# Patient Record
Sex: FEMALE | Race: BLACK OR AFRICAN AMERICAN | NOT HISPANIC OR LATINO
[De-identification: names, ages, dates, MRNs, and addresses within clinical notes are randomized per-mention and may not be internally consistent; named-entity substitution may affect disease eponyms.]

---

## 2020-07-10 ENCOUNTER — APPOINTMENT (OUTPATIENT)
Dept: GYNECOLOGIC ONCOLOGY | Facility: CLINIC | Age: 70
End: 2020-07-10
Payer: MEDICARE

## 2020-07-10 VITALS
WEIGHT: 174 LBS | BODY MASS INDEX: 29.71 KG/M2 | HEART RATE: 93 BPM | DIASTOLIC BLOOD PRESSURE: 95 MMHG | HEIGHT: 64 IN | SYSTOLIC BLOOD PRESSURE: 155 MMHG

## 2020-07-10 DIAGNOSIS — Z86.39 PERSONAL HISTORY OF OTHER ENDOCRINE, NUTRITIONAL AND METABOLIC DISEASE: ICD-10-CM

## 2020-07-10 PROCEDURE — 99205 OFFICE O/P NEW HI 60 MIN: CPT

## 2020-07-10 RX ORDER — CALCIUM CITRATE/VITAMIN D3 315MG-6.25
TABLET ORAL
Refills: 0 | Status: ACTIVE | COMMUNITY

## 2020-07-12 ENCOUNTER — TRANSCRIPTION ENCOUNTER (OUTPATIENT)
Age: 70
End: 2020-07-12

## 2020-07-13 ENCOUNTER — OUTPATIENT (OUTPATIENT)
Dept: OUTPATIENT SERVICES | Facility: HOSPITAL | Age: 70
LOS: 1 days | End: 2020-07-13
Payer: MEDICARE

## 2020-07-13 ENCOUNTER — APPOINTMENT (OUTPATIENT)
Dept: CT IMAGING | Facility: CLINIC | Age: 70
End: 2020-07-13
Payer: MEDICARE

## 2020-07-13 ENCOUNTER — RESULT REVIEW (OUTPATIENT)
Age: 70
End: 2020-07-13

## 2020-07-13 DIAGNOSIS — D25.9 LEIOMYOMA OF UTERUS, UNSPECIFIED: ICD-10-CM

## 2020-07-13 DIAGNOSIS — N83.8 OTHER NONINFLAMMATORY DISORDERS OF OVARY, FALLOPIAN TUBE AND BROAD LIGAMENT: ICD-10-CM

## 2020-07-13 DIAGNOSIS — R14.0 ABDOMINAL DISTENSION (GASEOUS): ICD-10-CM

## 2020-07-13 LAB
CANCER AG125 SERPL-ACNC: 16 U/ML
CANCER AG19-9 SERPL-ACNC: 39 U/ML
CEA SERPL-MCNC: 2.2 NG/ML

## 2020-07-13 PROCEDURE — 82565 ASSAY OF CREATININE: CPT

## 2020-07-13 PROCEDURE — 74177 CT ABD & PELVIS W/CONTRAST: CPT

## 2020-07-13 PROCEDURE — 74177 CT ABD & PELVIS W/CONTRAST: CPT | Mod: 26

## 2020-08-04 ENCOUNTER — APPOINTMENT (OUTPATIENT)
Dept: GYNECOLOGIC ONCOLOGY | Facility: CLINIC | Age: 70
End: 2020-08-04
Payer: MEDICARE

## 2020-08-04 DIAGNOSIS — R14.0 ABDOMINAL DISTENSION (GASEOUS): ICD-10-CM

## 2020-08-04 PROCEDURE — 99213 OFFICE O/P EST LOW 20 MIN: CPT | Mod: 95

## 2020-08-05 RX ORDER — BLOOD-GLUCOSE METER
W/DEVICE EACH MISCELLANEOUS
Qty: 1 | Refills: 0 | Status: ACTIVE | COMMUNITY
Start: 2020-06-18

## 2020-08-05 RX ORDER — LANCETS 33 GAUGE
EACH MISCELLANEOUS
Qty: 100 | Refills: 0 | Status: ACTIVE | COMMUNITY
Start: 2020-06-22

## 2020-08-08 ENCOUNTER — TRANSCRIPTION ENCOUNTER (OUTPATIENT)
Age: 70
End: 2020-08-08

## 2020-09-02 ENCOUNTER — TRANSCRIPTION ENCOUNTER (OUTPATIENT)
Age: 70
End: 2020-09-02

## 2020-09-03 ENCOUNTER — TRANSCRIPTION ENCOUNTER (OUTPATIENT)
Age: 70
End: 2020-09-03

## 2020-09-08 ENCOUNTER — OUTPATIENT (OUTPATIENT)
Dept: OUTPATIENT SERVICES | Facility: HOSPITAL | Age: 70
LOS: 1 days | End: 2020-09-08
Payer: MEDICARE

## 2020-09-08 VITALS
HEIGHT: 63 IN | WEIGHT: 177.91 LBS | OXYGEN SATURATION: 98 % | SYSTOLIC BLOOD PRESSURE: 124 MMHG | DIASTOLIC BLOOD PRESSURE: 80 MMHG | TEMPERATURE: 98 F | RESPIRATION RATE: 16 BRPM | HEART RATE: 98 BPM

## 2020-09-08 DIAGNOSIS — N83.8 OTHER NONINFLAMMATORY DISORDERS OF OVARY, FALLOPIAN TUBE AND BROAD LIGAMENT: ICD-10-CM

## 2020-09-08 DIAGNOSIS — D25.9 LEIOMYOMA OF UTERUS, UNSPECIFIED: ICD-10-CM

## 2020-09-08 DIAGNOSIS — Z98.890 OTHER SPECIFIED POSTPROCEDURAL STATES: Chronic | ICD-10-CM

## 2020-09-08 LAB
ANION GAP SERPL CALC-SCNC: 15 MMO/L — HIGH (ref 7–14)
BLD GP AB SCN SERPL QL: NEGATIVE — SIGNIFICANT CHANGE UP
BUN SERPL-MCNC: 10 MG/DL — SIGNIFICANT CHANGE UP (ref 7–23)
CALCIUM SERPL-MCNC: 10.1 MG/DL — SIGNIFICANT CHANGE UP (ref 8.4–10.5)
CHLORIDE SERPL-SCNC: 103 MMOL/L — SIGNIFICANT CHANGE UP (ref 98–107)
CO2 SERPL-SCNC: 25 MMOL/L — SIGNIFICANT CHANGE UP (ref 22–31)
CREAT SERPL-MCNC: 0.79 MG/DL — SIGNIFICANT CHANGE UP (ref 0.5–1.3)
GLUCOSE SERPL-MCNC: 162 MG/DL — HIGH (ref 70–99)
HBA1C BLD-MCNC: 7.2 % — HIGH (ref 4–5.6)
HCT VFR BLD CALC: 44.8 % — SIGNIFICANT CHANGE UP (ref 34.5–45)
HGB BLD-MCNC: 14.6 G/DL — SIGNIFICANT CHANGE UP (ref 11.5–15.5)
MCHC RBC-ENTMCNC: 31.9 PG — SIGNIFICANT CHANGE UP (ref 27–34)
MCHC RBC-ENTMCNC: 32.6 % — SIGNIFICANT CHANGE UP (ref 32–36)
MCV RBC AUTO: 97.8 FL — SIGNIFICANT CHANGE UP (ref 80–100)
NRBC # FLD: 0 K/UL — SIGNIFICANT CHANGE UP (ref 0–0)
PLATELET # BLD AUTO: 216 K/UL — SIGNIFICANT CHANGE UP (ref 150–400)
PMV BLD: 11.4 FL — SIGNIFICANT CHANGE UP (ref 7–13)
POTASSIUM SERPL-MCNC: 4.5 MMOL/L — SIGNIFICANT CHANGE UP (ref 3.5–5.3)
POTASSIUM SERPL-SCNC: 4.5 MMOL/L — SIGNIFICANT CHANGE UP (ref 3.5–5.3)
RBC # BLD: 4.58 M/UL — SIGNIFICANT CHANGE UP (ref 3.8–5.2)
RBC # FLD: 11.8 % — SIGNIFICANT CHANGE UP (ref 10.3–14.5)
RH IG SCN BLD-IMP: POSITIVE — SIGNIFICANT CHANGE UP
SODIUM SERPL-SCNC: 143 MMOL/L — SIGNIFICANT CHANGE UP (ref 135–145)
WBC # BLD: 7.68 K/UL — SIGNIFICANT CHANGE UP (ref 3.8–10.5)
WBC # FLD AUTO: 7.68 K/UL — SIGNIFICANT CHANGE UP (ref 3.8–10.5)

## 2020-09-08 PROCEDURE — 93010 ELECTROCARDIOGRAM REPORT: CPT | Mod: 76

## 2020-09-08 NOTE — H&P PST ADULT - NEGATIVE CARDIOVASCULAR SYMPTOMS
no claudication/no peripheral edema/no chest pain/no palpitations/no paroxysmal nocturnal dyspnea/no dyspnea on exertion

## 2020-09-08 NOTE — H&P PST ADULT - NEGATIVE GENERAL GENITOURINARY SYMPTOMS
no renal colic/no dysuria/no flank pain R/no flank pain L/no bladder infections/no urine discoloration/normal urinary frequency/no hematuria/no gas in urine/no urinary hesitancy/no nocturia

## 2020-09-08 NOTE — H&P PST ADULT - RS GEN PE MLT RESP DETAILS PC
respirations non-labored/no wheezes/no chest wall tenderness/clear to auscultation bilaterally/good air movement/airway patent/breath sounds equal/no intercostal retractions no rales/clear to auscultation bilaterally/airway patent/no subcutaneous emphysema/good air movement/no wheezes/no intercostal retractions/respirations non-labored/no chest wall tenderness/breath sounds equal/no rhonchi

## 2020-09-08 NOTE — H&P PST ADULT - NSICDXPROBLEM_GEN_ALL_CORE_FT
PROBLEM DIAGNOSES  Problem: Leiomyoma of uterus, unspecified  Assessment and Plan: Scheduled for robotic assisted total laparoscopic hysterectomy, bilateral salpingo oophorectomy, possible staging, possible open on 09/17/20. Pre op instructions, famotidine, chlorhexidine gluconate soap given and explained. Pt verbalized understanding.  Pending medical conmsultation as per surgeon's request  Pt has scheduled appt for Coid-19 swab on 09/14/20

## 2020-09-08 NOTE — H&P PST ADULT - NSANTHOSAYNRD_GEN_A_CORE
No. ALIREZA screening performed.  STOP BANG Legend: 0-2 = LOW Risk; 3-4 = INTERMEDIATE Risk; 5-8 = HIGH Risk

## 2020-09-08 NOTE — H&P PST ADULT - HISTORY OF PRESENT ILLNESS
69 y/o Black female with PMHx of DM type 2, Dyslipidemia, Depresdsion with anxiety presents to PST for pre op evaluation   S/P US of abd. pelvis "didn't show my ovary". S/P MRI 12/2019 showed left ovarian mass,. S/P Ct scan on 07/2020 69 y/o Black female with PMHx of DM type 2, Dyslipidemia, Depression with anxiety presents to PST for pre op evaluation S/P US of abd. pelvis "didn't show my ovary". S/P MRI 12/2019 showed left ovarian mass,. S/P Ct scan on 07/2020. Pre op diagnosis: Leiomyoma of uterus, unspecified. Now scheduled for robotic assisted total laparoscopic hysterectomy, bilateral salpingo oophorectomy, possible staging, possible open on 09/17/20

## 2020-09-08 NOTE — H&P PST ADULT - NEGATIVE ENMT SYMPTOMS
no hearing difficulty/no nasal congestion/no post-nasal discharge no throat pain/no tinnitus/no vertigo/no recurrent cold sores/no abnormal taste sensation/no nasal congestion/no nose bleeds/no hearing difficulty/no ear pain/no gum bleeding/no nasal obstruction/no sinus symptoms/no nasal discharge/no post-nasal discharge/no dry mouth

## 2020-09-08 NOTE — H&P PST ADULT - NEGATIVE NEUROLOGICAL SYMPTOMS
no difficulty walking/no vertigo/no loss of sensation/no generalized seizures/no syncope/no tremors/no transient paralysis/no weakness/no paresthesias/no headache

## 2020-09-08 NOTE — H&P PST ADULT - NEGATIVE OPHTHALMOLOGIC SYMPTOMS
no pain L/no loss of vision R/no irritation R/no diplopia/no photophobia/no pain R/no loss of vision L/no blurred vision L/no blurred vision R/no lacrimation L/no lacrimation R/no discharge L

## 2020-09-08 NOTE — H&P PST ADULT - GASTROINTESTINAL DETAILS
nontender/no distention/soft/no masses palpable/bowel sounds normal no guarding/no rebound tenderness/no rigidity/bowel sounds normal/nontender/soft

## 2020-09-14 ENCOUNTER — TRANSCRIPTION ENCOUNTER (OUTPATIENT)
Age: 70
End: 2020-09-14

## 2020-09-14 ENCOUNTER — APPOINTMENT (OUTPATIENT)
Dept: DISASTER EMERGENCY | Facility: CLINIC | Age: 70
End: 2020-09-14

## 2020-09-15 LAB — SARS-COV-2 N GENE NPH QL NAA+PROBE: NOT DETECTED

## 2020-09-16 ENCOUNTER — TRANSCRIPTION ENCOUNTER (OUTPATIENT)
Age: 70
End: 2020-09-16

## 2020-09-16 ENCOUNTER — OUTPATIENT (OUTPATIENT)
Dept: OUTPATIENT SERVICES | Facility: HOSPITAL | Age: 70
LOS: 1 days | End: 2020-09-16

## 2020-09-16 ENCOUNTER — APPOINTMENT (OUTPATIENT)
Dept: CV DIAGNOSTICS | Facility: HOSPITAL | Age: 70
End: 2020-09-16
Payer: MEDICARE

## 2020-09-16 DIAGNOSIS — Z01.818 ENCOUNTER FOR OTHER PREPROCEDURAL EXAMINATION: ICD-10-CM

## 2020-09-16 DIAGNOSIS — Z98.890 OTHER SPECIFIED POSTPROCEDURAL STATES: Chronic | ICD-10-CM

## 2020-09-16 PROBLEM — E78.5 HYPERLIPIDEMIA, UNSPECIFIED: Chronic | Status: ACTIVE | Noted: 2020-09-08

## 2020-09-16 PROBLEM — F41.8 OTHER SPECIFIED ANXIETY DISORDERS: Chronic | Status: ACTIVE | Noted: 2020-09-08

## 2020-09-16 PROCEDURE — 93018 CV STRESS TEST I&R ONLY: CPT | Mod: GC

## 2020-09-16 PROCEDURE — 78452 HT MUSCLE IMAGE SPECT MULT: CPT | Mod: 26

## 2020-09-16 PROCEDURE — 93016 CV STRESS TEST SUPVJ ONLY: CPT | Mod: GC

## 2020-09-25 ENCOUNTER — TRANSCRIPTION ENCOUNTER (OUTPATIENT)
Age: 70
End: 2020-09-25

## 2020-10-03 PROBLEM — R14.0 BLOATING: Status: ACTIVE | Noted: 2020-07-10

## 2020-10-05 ENCOUNTER — TRANSCRIPTION ENCOUNTER (OUTPATIENT)
Age: 70
End: 2020-10-05

## 2020-10-05 ENCOUNTER — APPOINTMENT (OUTPATIENT)
Dept: DISASTER EMERGENCY | Facility: CLINIC | Age: 70
End: 2020-10-05

## 2020-10-07 RX ORDER — QUETIAPINE 300 MG/1
300 TABLET, FILM COATED ORAL DAILY
Refills: 0 | Status: ACTIVE | COMMUNITY

## 2020-10-07 RX ORDER — AZITHROMYCIN 250 MG/1
250 TABLET, FILM COATED ORAL
Qty: 6 | Refills: 0 | Status: DISCONTINUED | COMMUNITY
Start: 2020-03-05 | End: 2020-10-07

## 2020-10-07 RX ORDER — PREDNISONE 20 MG/1
20 TABLET ORAL
Qty: 8 | Refills: 0 | Status: DISCONTINUED | COMMUNITY
Start: 2020-03-05 | End: 2020-10-07

## 2020-10-07 RX ORDER — QUETIAPINE FUMARATE 300 MG/1
300 TABLET ORAL
Qty: 90 | Refills: 0 | Status: DISCONTINUED | COMMUNITY
Start: 2020-06-05 | End: 2020-10-07

## 2020-10-07 RX ORDER — ATORVASTATIN CALCIUM 80 MG/1
TABLET, FILM COATED ORAL
Refills: 0 | Status: DISCONTINUED | COMMUNITY
End: 2020-10-07

## 2020-10-08 ENCOUNTER — NON-APPOINTMENT (OUTPATIENT)
Age: 70
End: 2020-10-08

## 2020-10-08 ENCOUNTER — APPOINTMENT (OUTPATIENT)
Dept: GYNECOLOGIC ONCOLOGY | Facility: HOSPITAL | Age: 70
End: 2020-10-08

## 2020-10-08 ENCOUNTER — APPOINTMENT (OUTPATIENT)
Dept: CARDIOLOGY | Facility: CLINIC | Age: 70
End: 2020-10-08
Payer: MEDICARE

## 2020-10-08 VITALS
WEIGHT: 178 LBS | OXYGEN SATURATION: 96 % | BODY MASS INDEX: 30.39 KG/M2 | HEIGHT: 64 IN | SYSTOLIC BLOOD PRESSURE: 156 MMHG | HEART RATE: 114 BPM | TEMPERATURE: 97.8 F | DIASTOLIC BLOOD PRESSURE: 93 MMHG

## 2020-10-08 PROCEDURE — 99204 OFFICE O/P NEW MOD 45 MIN: CPT

## 2020-10-08 PROCEDURE — 93000 ELECTROCARDIOGRAM COMPLETE: CPT

## 2020-10-08 NOTE — PHYSICAL EXAM
[General Appearance - Well Developed] : well developed [Normal Appearance] : normal appearance [Well Groomed] : well groomed [General Appearance - Well Nourished] : well nourished [No Deformities] : no deformities [General Appearance - In No Acute Distress] : no acute distress [Normal Oral Mucosa] : normal oral mucosa [No Oral Pallor] : no oral pallor [No Oral Cyanosis] : no oral cyanosis [Normal Jugular Venous A Waves Present] : normal jugular venous A waves present [Normal Jugular Venous V Waves Present] : normal jugular venous V waves present [No Jugular Venous Umaña A Waves] : no jugular venous umaña A waves [Heart Rate And Rhythm] : heart rate and rhythm were normal [Heart Sounds] : normal S1 and S2 [Murmurs] : no murmurs present [Respiration, Rhythm And Depth] : normal respiratory rhythm and effort [Exaggerated Use Of Accessory Muscles For Inspiration] : no accessory muscle use [Auscultation Breath Sounds / Voice Sounds] : lungs were clear to auscultation bilaterally [Abdomen Soft] : soft [Abdomen Tenderness] : non-tender [Abdomen Mass (___ Cm)] : no abdominal mass palpated [Abnormal Walk] : normal gait [Gait - Sufficient For Exercise Testing] : the gait was sufficient for exercise testing [Nail Clubbing] : no clubbing of the fingernails [Cyanosis, Localized] : no localized cyanosis [Petechial Hemorrhages (___cm)] : no petechial hemorrhages [Skin Color & Pigmentation] : normal skin color and pigmentation [] : no rash [No Venous Stasis] : no venous stasis [Skin Lesions] : no skin lesions [No Skin Ulcers] : no skin ulcer [No Xanthoma] : no  xanthoma was observed [Oriented To Time, Place, And Person] : oriented to person, place, and time [Affect] : the affect was normal [Mood] : the mood was normal [No Anxiety] : not feeling anxious

## 2020-10-08 NOTE — DISCUSSION/SUMMARY
[FreeTextEntry1] : 70 year old woman with history of suspicous mass on CT/MRI going for BERNY-BSO who is here for evlaution after abnormal stress test\par Patient with small, mild defect in the inferior wall consistent with mild ischemia\par No chest pain, dyspnea ir palpitations.\par EKG without ischemia\par She is on baby ASA\par BP is elevated- will start low dose Toprol 25 mg daily to assist with hypertension and abnormal stress test.\par Patient medically optimized for GYN surgery\par FU after surgery\par \par

## 2020-10-08 NOTE — HISTORY OF PRESENT ILLNESS
[FreeTextEntry1] : 70 year old woman with history of DM (on Metformin), HLD (on atorvastatin) and anxiety (on depakote and seroquel) who is here to establish care. She was being followed by outside cardiologsit and was sent in for preop eval to do nuclear stress test. Nuclear stress test showed normal LV function however small, mild inferior and inferoseptal ischemia. \par TTE done as outpatient 10/3/2020: that showed kaycee LV function\par She is due to undergo BERNY-BSO with Constantine Ngo due to suspicious mass \par She has no chest pain, dyspnea.\par EKG reviewed and normal\par SHe is active and she cooks and cleans and shops for herself. She gardens regularly.\par No smoking.

## 2020-10-08 NOTE — REASON FOR VISIT
[Initial Evaluation] : an initial evaluation of [FreeTextEntry2] : abnormal stress test, preop evaluation

## 2020-10-14 ENCOUNTER — RESULT REVIEW (OUTPATIENT)
Age: 70
End: 2020-10-14

## 2020-10-14 ENCOUNTER — OUTPATIENT (OUTPATIENT)
Dept: OUTPATIENT SERVICES | Facility: HOSPITAL | Age: 70
LOS: 1 days | End: 2020-10-14
Payer: MEDICARE

## 2020-10-14 ENCOUNTER — APPOINTMENT (OUTPATIENT)
Dept: SURGICAL ONCOLOGY | Facility: CLINIC | Age: 70
End: 2020-10-14
Payer: MEDICARE

## 2020-10-14 VITALS
RESPIRATION RATE: 15 BRPM | DIASTOLIC BLOOD PRESSURE: 83 MMHG | BODY MASS INDEX: 29.71 KG/M2 | HEIGHT: 64 IN | HEART RATE: 86 BPM | OXYGEN SATURATION: 96 % | WEIGHT: 174 LBS | SYSTOLIC BLOOD PRESSURE: 145 MMHG

## 2020-10-14 DIAGNOSIS — R92.1 MAMMOGRAPHIC CALCIFICATION FOUND ON DIAGNOSTIC IMAGING OF BREAST: ICD-10-CM

## 2020-10-14 DIAGNOSIS — R94.39 ABNORMAL RESULT OF OTHER CARDIOVASCULAR FUNCTION STUDY: ICD-10-CM

## 2020-10-14 DIAGNOSIS — Z98.890 OTHER SPECIFIED POSTPROCEDURAL STATES: Chronic | ICD-10-CM

## 2020-10-14 DIAGNOSIS — C50.919 MALIGNANT NEOPLASM OF UNSPECIFIED SITE OF UNSPECIFIED FEMALE BREAST: ICD-10-CM

## 2020-10-14 PROCEDURE — 99204 OFFICE O/P NEW MOD 45 MIN: CPT

## 2020-10-14 PROCEDURE — 88321 CONSLTJ&REPRT SLD PREP ELSWR: CPT

## 2020-10-14 NOTE — END OF VISIT
Thank you for coming by. Please come by in 1 and 6 months so we can monitor your blood pressure.    [Time Spent: ___ minutes] : I have spent [unfilled] minutes of time on the encounter. [>50% of the face to face encounter time was spent on counseling and/or coordination of care for ___] : Greater than 50% of the face to face encounter time was spent on counseling and/or coordination of care for [unfilled]

## 2020-10-16 PROBLEM — R94.39 ABNORMAL STRESS TEST: Status: RESOLVED | Noted: 2020-10-07 | Resolved: 2020-10-16

## 2020-10-16 NOTE — PAST MEDICAL HISTORY
[Postmenopausal] : The patient is postmenopausal [Menarche Age ____] : age at menarche was [unfilled] [Menopause Age____] : age at menopause was [unfilled] [History of Hormone Replacement Treatment] : has no history of hormone replacement treatment [Total Preg ___] : G[unfilled] [Live Births ___] : P[unfilled]  [AB Spont ___] : miscarriages: [unfilled]  [FreeTextEntry6] : none [FreeTextEntry7] : 1 year [FreeTextEntry8] : 3 months

## 2020-10-16 NOTE — CONSULT LETTER
[Dear  ___] : Dear  [unfilled], [Consult Letter:] : I had the pleasure of evaluating your patient, [unfilled]. [Please see my note below.] : Please see my note below. [Consult Closing:] : Thank you very much for allowing me to participate in the care of this patient.  If you have any questions, please do not hesitate to contact me. [Sincerely,] : Sincerely, [DrMarkus  ___] : Dr. CHINCHILLA [FreeTextEntry3] : Bella Mckeon MD\par Breast Surgeon\par Division of Surgical Oncology\par Department of Surgery\par [address] [place] [address]\[place] [address] \par Tel: [phone]\par Fax: [phone]\par Email: [email or web address]

## 2020-10-16 NOTE — HISTORY OF PRESENT ILLNESS
[FreeTextEntry1] : The patient is a 70 year old female presenting for consultation regarding new diagnosis of Right breast intraductal papilloma.\par \par A R breast US was performed 7/22/2020 for a BIRADS 3 study previously. This showed a Right subareolar ductal dilatation with redemonstration of a 9 x 3 x 5 mm hypoechoic intraductal mass. BIRADS 4A.\par \par A 9/28/2020 R USG-CNB was performed which revealed sclerosing intraductal papilloma.\par \par Today, the patient reports no breast complaints. Denies pain, masses, skin changes, or nipple discharge.\par

## 2020-10-16 NOTE — ASSESSMENT
[FreeTextEntry1] : The patient is a 70 year old female with a new diagnosis of Right breast intraductal papilloma.\par \par As these are high risk markers, excision is recommended.  I discussed with the patient that papillary lesions are difficult for pathologist to read on needle biopsy and thus excision is always recommended, to allow histologic evaluation of the entire lesion.  There is approximately 5 to 8% incidence of upgrading of these lesions, most commonly to either atypical hyperplasia or DCIS.  If this is not upgraded, this is not a breast cancer risk factor and she should return to routine breast care.  \par \par Preoperative, intraoperative, and postoperative considerations were reviewed including anesthetic management and what she can expect when she is recovering from surgery. \par \par We also discussed the finding of the likely breast lipoma on her exam. The patient has noticed some "fullness" in the area for a long time, but has not noticed any changes in size. Report from her most recent R breast US does not mention an upper breast mass. We will follow-up prior imaging to follow size and appearance of the mass.\par \par All questions were answered to her satisfaction. Risks, benefits, and alternatives to proposed surgical procedure were reviewed and all questions were answered. Following our discussion, the patient will undergo a right breast excisional biopsy with ANTHONY  localization. She has already obtained medical clearance for a GYN procedure to be done next month. \par

## 2020-10-16 NOTE — PHYSICAL EXAM
[Normocephalic] : normocephalic [Atraumatic] : atraumatic [EOMI] : extra ocular movement intact [Supple] : supple [No Supraclavicular Adenopathy] : no supraclavicular adenopathy [No Cervical Adenopathy] : no cervical adenopathy [Examined in the supine and seated position] : examined in the supine and seated position [Symmetrical] : symmetrical [Bra Size: ___] : Bra Size: [unfilled] [No dominant masses] : no dominant masses left breast [No Nipple Retraction] : no left nipple retraction [No Nipple Discharge] : no left nipple discharge [Breast Nipple Inversion] : nipples not inverted [Breast Mass Left Breast ___cm] : no masses [Breast Nipple Retraction] : nipples not retracted [Breast Nipple Fissures] : nipples not fissured [Breast Nipple Flattening] : nipples not flattened [Breast Abnormal Secretion Bloody Fluid] : no bloody discharge [Breast Abnormal Secretion Opalescent Fluid] : no milky discharge [Breast Abnormal Secretion Serous Fluid] : no serous discharge [Breast Abnormal Lactation (Galactorrhea)] : no galactorrhea [No Axillary Lymphadenopathy] : no right axillary lymphadenopathy [Soft] : abdomen soft [No Swelling] : no swelling [No Edema] : no edema [Full ROM] : full range of motion [No Rashes] : no rashes [de-identified] : non-labored respirations  [No Ulceration] : no ulceration [de-identified] : Right upper breast immediately below clavicle is an approximate 4 cm well-circumscribed soft, fatty mobile mass (not described on most recent US 7/22/2020)

## 2020-10-20 ENCOUNTER — OUTPATIENT (OUTPATIENT)
Dept: OUTPATIENT SERVICES | Facility: HOSPITAL | Age: 70
LOS: 1 days | End: 2020-10-20

## 2020-10-20 VITALS
WEIGHT: 171.96 LBS | HEIGHT: 63.5 IN | TEMPERATURE: 98 F | OXYGEN SATURATION: 98 % | HEART RATE: 83 BPM | DIASTOLIC BLOOD PRESSURE: 74 MMHG | RESPIRATION RATE: 16 BRPM | SYSTOLIC BLOOD PRESSURE: 120 MMHG

## 2020-10-20 DIAGNOSIS — N63.0 UNSPECIFIED LUMP IN UNSPECIFIED BREAST: ICD-10-CM

## 2020-10-20 DIAGNOSIS — D36.9 BENIGN NEOPLASM, UNSPECIFIED SITE: ICD-10-CM

## 2020-10-20 DIAGNOSIS — E11.9 TYPE 2 DIABETES MELLITUS WITHOUT COMPLICATIONS: ICD-10-CM

## 2020-10-20 DIAGNOSIS — Z98.890 OTHER SPECIFIED POSTPROCEDURAL STATES: Chronic | ICD-10-CM

## 2020-10-20 DIAGNOSIS — Z01.818 ENCOUNTER FOR OTHER PREPROCEDURAL EXAMINATION: ICD-10-CM

## 2020-10-20 LAB
ANION GAP SERPL CALC-SCNC: 14 MMO/L — SIGNIFICANT CHANGE UP (ref 7–14)
BUN SERPL-MCNC: 15 MG/DL — SIGNIFICANT CHANGE UP (ref 7–23)
CALCIUM SERPL-MCNC: 9.5 MG/DL — SIGNIFICANT CHANGE UP (ref 8.4–10.5)
CHLORIDE SERPL-SCNC: 97 MMOL/L — LOW (ref 98–107)
CO2 SERPL-SCNC: 28 MMOL/L — SIGNIFICANT CHANGE UP (ref 22–31)
CREAT SERPL-MCNC: 0.7 MG/DL — SIGNIFICANT CHANGE UP (ref 0.5–1.3)
GLUCOSE SERPL-MCNC: 178 MG/DL — HIGH (ref 70–99)
HBA1C BLD-MCNC: 7.6 % — HIGH (ref 4–5.6)
HCT VFR BLD CALC: 41.5 % — SIGNIFICANT CHANGE UP (ref 34.5–45)
HGB BLD-MCNC: 13.9 G/DL — SIGNIFICANT CHANGE UP (ref 11.5–15.5)
MCHC RBC-ENTMCNC: 31.3 PG — SIGNIFICANT CHANGE UP (ref 27–34)
MCHC RBC-ENTMCNC: 33.5 % — SIGNIFICANT CHANGE UP (ref 32–36)
MCV RBC AUTO: 93.5 FL — SIGNIFICANT CHANGE UP (ref 80–100)
NRBC # FLD: 0 K/UL — SIGNIFICANT CHANGE UP (ref 0–0)
PLATELET # BLD AUTO: 217 K/UL — SIGNIFICANT CHANGE UP (ref 150–400)
PMV BLD: 11.5 FL — SIGNIFICANT CHANGE UP (ref 7–13)
POTASSIUM SERPL-MCNC: 4 MMOL/L — SIGNIFICANT CHANGE UP (ref 3.5–5.3)
POTASSIUM SERPL-SCNC: 4 MMOL/L — SIGNIFICANT CHANGE UP (ref 3.5–5.3)
RBC # BLD: 4.44 M/UL — SIGNIFICANT CHANGE UP (ref 3.8–5.2)
RBC # FLD: 11.7 % — SIGNIFICANT CHANGE UP (ref 10.3–14.5)
SODIUM SERPL-SCNC: 139 MMOL/L — SIGNIFICANT CHANGE UP (ref 135–145)
WBC # BLD: 7.44 K/UL — SIGNIFICANT CHANGE UP (ref 3.8–10.5)
WBC # FLD AUTO: 7.44 K/UL — SIGNIFICANT CHANGE UP (ref 3.8–10.5)

## 2020-10-20 NOTE — H&P PST ADULT - NSICDXPROBLEM_GEN_ALL_CORE_FT
PROBLEM DIAGNOSES  Problem: Benign breast lumps  Assessment and Plan: Pt scheduled for surgery and preop instructions including instructions for taking Famotidine and for Chlorhexidine use in showering on the day of surgery, given verbally and with use of  written materials, and patient confirming understanding of such instructions using  teach back   method.  OR booking notified of yue precautions and DM  Cardiac Clearance in chart     Problem: DM (diabetes mellitus)  Assessment and Plan: Pt to take last dose of Metformin 10/24/20 pm dose

## 2020-10-20 NOTE — H&P PST ADULT - NSICDXPASTMEDICALHX_GEN_ALL_CORE_FT
PAST MEDICAL HISTORY:  Depression with anxiety     Diabetes mellitus, type 2     Dyslipidemia     H/O benign neoplasm of breast     H/O ovarian cystectomy     Obesity     Uterine fibroid

## 2020-10-20 NOTE — H&P PST ADULT - HISTORY OF PRESENT ILLNESS
Pt is a     71 y/o Black female with PMHx of DM type 2, Dyslipidemia, Depression with anxiety presents to PST for pre op evaluation S/P US of abd. pelvis "didn't show my ovary". S/P MRI 12/2019 showed left ovarian mass,. S/P Ct scan on 07/2020. Pre op diagnosis: Leiomyoma of uterus, unspecified. Now scheduled for robotic assisted total laparoscopic hysterectomy, bilateral salpingo oophorectomy, possible staging, possible open on 09/17/20 Pt is a 70 yr old female scheduled for Right Breast Excisional Biopsy with Lala  Localization with dr Mckeon tentatively 10/26/20 - pt found to have breast lump on Mammo 8/20 and u/s done - biopsy benign. Pt denies pain or bruising at biopsy site. Pt also to have Hysterectomy 11/12/20 for Uterine fibroid and ovarian mass - surgery had been postponed for CC - Dr Workman clearance in [employer] and printed for PST   Pt denies COVID or recent travel   pt to have COVID preop kelsey t

## 2020-10-21 ENCOUNTER — APPOINTMENT (OUTPATIENT)
Dept: SURGICAL ONCOLOGY | Facility: CLINIC | Age: 70
End: 2020-10-21

## 2020-10-22 ENCOUNTER — APPOINTMENT (OUTPATIENT)
Dept: DISASTER EMERGENCY | Facility: CLINIC | Age: 70
End: 2020-10-22

## 2020-10-22 PROBLEM — E66.9 OBESITY, UNSPECIFIED: Chronic | Status: ACTIVE | Noted: 2020-10-20

## 2020-10-22 PROBLEM — Z98.890 OTHER SPECIFIED POSTPROCEDURAL STATES: Chronic | Status: ACTIVE | Noted: 2020-10-20

## 2020-10-22 PROBLEM — Z86.018 PERSONAL HISTORY OF OTHER BENIGN NEOPLASM: Chronic | Status: ACTIVE | Noted: 2020-10-20

## 2020-10-23 ENCOUNTER — APPOINTMENT (OUTPATIENT)
Dept: MAMMOGRAPHY | Facility: IMAGING CENTER | Age: 70
End: 2020-10-23
Payer: MEDICARE

## 2020-10-23 ENCOUNTER — TRANSCRIPTION ENCOUNTER (OUTPATIENT)
Age: 70
End: 2020-10-23

## 2020-10-23 ENCOUNTER — RESULT REVIEW (OUTPATIENT)
Age: 70
End: 2020-10-23

## 2020-10-23 ENCOUNTER — NON-APPOINTMENT (OUTPATIENT)
Age: 70
End: 2020-10-23

## 2020-10-23 ENCOUNTER — OUTPATIENT (OUTPATIENT)
Dept: OUTPATIENT SERVICES | Facility: HOSPITAL | Age: 70
LOS: 1 days | End: 2020-10-23
Payer: MEDICARE

## 2020-10-23 DIAGNOSIS — Z98.890 OTHER SPECIFIED POSTPROCEDURAL STATES: Chronic | ICD-10-CM

## 2020-10-23 DIAGNOSIS — Z00.8 ENCOUNTER FOR OTHER GENERAL EXAMINATION: ICD-10-CM

## 2020-10-23 PROBLEM — R92.1 BREAST CALCIFICATIONS ON MAMMOGRAM: Status: ACTIVE | Noted: 2020-10-23

## 2020-10-23 LAB — SARS-COV-2 N GENE NPH QL NAA+PROBE: NOT DETECTED

## 2020-10-23 PROCEDURE — 77066 DX MAMMO INCL CAD BI: CPT | Mod: 26,59

## 2020-10-23 PROCEDURE — 19281 PERQ DEVICE BREAST 1ST IMAG: CPT | Mod: RT

## 2020-10-23 PROCEDURE — 19281 PERQ DEVICE BREAST 1ST IMAG: CPT

## 2020-10-23 PROCEDURE — 76641 ULTRASOUND BREAST COMPLETE: CPT

## 2020-10-23 PROCEDURE — 76641 ULTRASOUND BREAST COMPLETE: CPT | Mod: 26,50

## 2020-10-23 PROCEDURE — G0279: CPT | Mod: 26

## 2020-10-23 PROCEDURE — 77066 DX MAMMO INCL CAD BI: CPT

## 2020-10-23 PROCEDURE — G0279: CPT

## 2020-10-23 PROCEDURE — C1739: CPT

## 2020-10-26 ENCOUNTER — APPOINTMENT (OUTPATIENT)
Dept: MAMMOGRAPHY | Facility: IMAGING CENTER | Age: 70
End: 2020-10-26

## 2020-10-30 ENCOUNTER — RESULT REVIEW (OUTPATIENT)
Age: 70
End: 2020-10-30

## 2020-10-30 ENCOUNTER — OUTPATIENT (OUTPATIENT)
Dept: OUTPATIENT SERVICES | Facility: HOSPITAL | Age: 70
LOS: 1 days | End: 2020-10-30
Payer: MEDICARE

## 2020-10-30 ENCOUNTER — APPOINTMENT (OUTPATIENT)
Dept: MAMMOGRAPHY | Facility: IMAGING CENTER | Age: 70
End: 2020-10-30
Payer: MEDICARE

## 2020-10-30 DIAGNOSIS — R92.1 MAMMOGRAPHIC CALCIFICATION FOUND ON DIAGNOSTIC IMAGING OF BREAST: ICD-10-CM

## 2020-10-30 DIAGNOSIS — Z98.890 OTHER SPECIFIED POSTPROCEDURAL STATES: Chronic | ICD-10-CM

## 2020-10-30 DIAGNOSIS — Z00.8 ENCOUNTER FOR OTHER GENERAL EXAMINATION: ICD-10-CM

## 2020-10-30 PROCEDURE — 77065 DX MAMMO INCL CAD UNI: CPT

## 2020-10-30 PROCEDURE — A4648: CPT

## 2020-10-30 PROCEDURE — 77065 DX MAMMO INCL CAD UNI: CPT | Mod: 26,RT

## 2020-10-30 PROCEDURE — 19081 BX BREAST 1ST LESION STRTCTC: CPT | Mod: RT

## 2020-10-30 PROCEDURE — 88305 TISSUE EXAM BY PATHOLOGIST: CPT

## 2020-10-30 PROCEDURE — 19081 BX BREAST 1ST LESION STRTCTC: CPT

## 2020-10-30 PROCEDURE — 88305 TISSUE EXAM BY PATHOLOGIST: CPT | Mod: 26

## 2020-11-03 LAB — SURGICAL PATHOLOGY STUDY: SIGNIFICANT CHANGE UP

## 2020-11-06 ENCOUNTER — OUTPATIENT (OUTPATIENT)
Dept: OUTPATIENT SERVICES | Facility: HOSPITAL | Age: 70
LOS: 1 days | End: 2020-11-06

## 2020-11-06 VITALS
SYSTOLIC BLOOD PRESSURE: 126 MMHG | OXYGEN SATURATION: 99 % | HEIGHT: 63.25 IN | RESPIRATION RATE: 16 BRPM | TEMPERATURE: 98 F | DIASTOLIC BLOOD PRESSURE: 80 MMHG | HEART RATE: 85 BPM | WEIGHT: 173.06 LBS

## 2020-11-06 DIAGNOSIS — R83.8 OTHER ABNORMAL FINDINGS IN CEREBROSPINAL FLUID: ICD-10-CM

## 2020-11-06 DIAGNOSIS — Z98.890 OTHER SPECIFIED POSTPROCEDURAL STATES: Chronic | ICD-10-CM

## 2020-11-06 DIAGNOSIS — D25.9 LEIOMYOMA OF UTERUS, UNSPECIFIED: ICD-10-CM

## 2020-11-06 DIAGNOSIS — E11.9 TYPE 2 DIABETES MELLITUS WITHOUT COMPLICATIONS: ICD-10-CM

## 2020-11-06 LAB
BLD GP AB SCN SERPL QL: NEGATIVE — SIGNIFICANT CHANGE UP
RH IG SCN BLD-IMP: POSITIVE — SIGNIFICANT CHANGE UP

## 2020-11-06 RX ORDER — SODIUM CHLORIDE 9 MG/ML
3 INJECTION INTRAMUSCULAR; INTRAVENOUS; SUBCUTANEOUS EVERY 8 HOURS
Refills: 0 | Status: DISCONTINUED | OUTPATIENT
Start: 2020-11-12 | End: 2020-11-26

## 2020-11-06 RX ORDER — SODIUM CHLORIDE 9 MG/ML
1000 INJECTION, SOLUTION INTRAVENOUS
Refills: 0 | Status: DISCONTINUED | OUTPATIENT
Start: 2020-11-12 | End: 2020-11-26

## 2020-11-06 NOTE — H&P PST ADULT - NSICDXPROBLEM_GEN_ALL_CORE_FT
PROBLEM DIAGNOSES  Problem: Leiomyoma of uterus, unspecified  Assessment and Plan: Scheduled for Robotic assisted total laparoscopic hysterectomy, BSO, possible staging, possible open on 11/12/2020. Pre op instructions,, famotidine, chlorhexidien gluconate soap given and explained. Pt verbalized understanding.   Pt confirmed scheduled appt for Covid-19 swab pre op  Pending medical consultation as per surgeon's request    Problem: Type 2 diabetes mellitus  Assessment and Plan: Monitor BS on day of surgery  Pt instructed to hold metfor,min 24 hours prior to surgery. Pt verbalized understanding.

## 2020-11-06 NOTE — H&P PST ADULT - NEGATIVE NEUROLOGICAL SYMPTOMS
no weakness/no generalized seizures/no loss of sensation/no difficulty walking/no transient paralysis/no vertigo/no tremors

## 2020-11-06 NOTE — H&P PST ADULT - RS GEN PE MLT RESP DETAILS PC
no rales/breath sounds equal/respirations non-labored/no subcutaneous emphysema/no rhonchi/no chest wall tenderness/no intercostal retractions/airway patent/no wheezes/good air movement/clear to auscultation bilaterally

## 2020-11-06 NOTE — H&P PST ADULT - NEGATIVE OPHTHALMOLOGIC SYMPTOMS
no diplopia/no photophobia/no blurred vision L/no pain R/no loss of vision L/no discharge L/no pain L/no blurred vision R/no discharge R/no loss of vision R/no lacrimation L/no lacrimation R/no irritation L/no irritation R

## 2020-11-06 NOTE — H&P PST ADULT - NEGATIVE CARDIOVASCULAR SYMPTOMS
no orthopnea/no claudication/no dyspnea on exertion/no palpitations/no chest pain/no peripheral edema

## 2020-11-06 NOTE — H&P PST ADULT - HISTORY OF PRESENT ILLNESS
70 yr old female with H/O DM type 2,   yearly GYN exam S/P US / MRI (showed left ovarian mass and fibroids.         Pt denies COVID or recent travel    70 yr old female with H/O DM type 2, Dyslipidemia, Depression with Anxiety [id number] to PST for preop evaluation stated that on yearly GYN exam - US / MRI (showed left ovarian mass and fibroids. Scheduled for robotic assisted total laparoscopic hysterectomy, BSO, possible staging possible open on 11/12/2020     Pt denies COVID or recent travel

## 2020-11-06 NOTE — H&P PST ADULT - NEGATIVE GENERAL GENITOURINARY SYMPTOMS
no renal colic/no flank pain R/no bladder infections/normal urinary frequency/no hematuria/no dysuria/no flank pain L/no urine discoloration/no gas in urine/no nocturia

## 2020-11-06 NOTE — H&P PST ADULT - NEGATIVE GASTROINTESTINAL SYMPTOMS
no change in bowel habits/no abdominal pain/no nausea/no diarrhea/no vomiting/no hiccoughs no hiccoughs/no vomiting/no change in bowel habits/no abdominal pain/no diarrhea/no nausea/no melena

## 2020-11-06 NOTE — H&P PST ADULT - BREAST SYMPTOMS
feels a little shock in the right breast since Lala  insertion 10/20/2020 breast tenderness R/breast lump R/feels a little shock in the right breast since Lala  insertion 10/20/2020

## 2020-11-09 ENCOUNTER — NON-APPOINTMENT (OUTPATIENT)
Age: 70
End: 2020-11-09

## 2020-11-09 ENCOUNTER — APPOINTMENT (OUTPATIENT)
Dept: DISASTER EMERGENCY | Facility: CLINIC | Age: 70
End: 2020-11-09

## 2020-11-09 LAB — SARS-COV-2 N GENE NPH QL NAA+PROBE: NOT DETECTED

## 2020-11-11 ENCOUNTER — TRANSCRIPTION ENCOUNTER (OUTPATIENT)
Age: 70
End: 2020-11-11

## 2020-11-11 NOTE — ASU PATIENT PROFILE, ADULT - PMH
Depression with anxiety    Diabetes mellitus, type 2    Dyslipidemia    H/O benign neoplasm of breast    H/O ovarian cystectomy    Obesity    Uterine fibroid

## 2020-11-12 ENCOUNTER — NON-APPOINTMENT (OUTPATIENT)
Age: 70
End: 2020-11-12

## 2020-11-12 ENCOUNTER — APPOINTMENT (OUTPATIENT)
Dept: GYNECOLOGIC ONCOLOGY | Facility: HOSPITAL | Age: 70
End: 2020-11-12

## 2020-11-12 ENCOUNTER — RESULT REVIEW (OUTPATIENT)
Age: 70
End: 2020-11-12

## 2020-11-12 ENCOUNTER — OUTPATIENT (OUTPATIENT)
Dept: OUTPATIENT SERVICES | Facility: HOSPITAL | Age: 70
LOS: 1 days | Discharge: HOME | End: 2020-11-12
Payer: MEDICARE

## 2020-11-12 VITALS
RESPIRATION RATE: 16 BRPM | OXYGEN SATURATION: 98 % | DIASTOLIC BLOOD PRESSURE: 73 MMHG | HEART RATE: 85 BPM | WEIGHT: 173.06 LBS | SYSTOLIC BLOOD PRESSURE: 139 MMHG | TEMPERATURE: 99 F | HEIGHT: 63.25 IN

## 2020-11-12 VITALS
HEART RATE: 92 BPM | RESPIRATION RATE: 16 BRPM | OXYGEN SATURATION: 98 % | SYSTOLIC BLOOD PRESSURE: 134 MMHG | DIASTOLIC BLOOD PRESSURE: 73 MMHG

## 2020-11-12 DIAGNOSIS — R83.8 OTHER ABNORMAL FINDINGS IN CEREBROSPINAL FLUID: ICD-10-CM

## 2020-11-12 DIAGNOSIS — Z98.890 OTHER SPECIFIED POSTPROCEDURAL STATES: Chronic | ICD-10-CM

## 2020-11-12 LAB
BASOPHILS # BLD AUTO: 0.03 K/UL — SIGNIFICANT CHANGE UP (ref 0–0.2)
BASOPHILS NFR BLD AUTO: 0.2 % — SIGNIFICANT CHANGE UP (ref 0–2)
EOSINOPHIL # BLD AUTO: 0.03 K/UL — SIGNIFICANT CHANGE UP (ref 0–0.5)
EOSINOPHIL NFR BLD AUTO: 0.2 % — SIGNIFICANT CHANGE UP (ref 0–6)
HCT VFR BLD CALC: 40.4 % — SIGNIFICANT CHANGE UP (ref 34.5–45)
HGB BLD-MCNC: 13.9 G/DL — SIGNIFICANT CHANGE UP (ref 11.5–15.5)
IMM GRANULOCYTES NFR BLD AUTO: 0.5 % — SIGNIFICANT CHANGE UP (ref 0–1.5)
LYMPHOCYTES # BLD AUTO: 1.1 K/UL — SIGNIFICANT CHANGE UP (ref 1–3.3)
LYMPHOCYTES # BLD AUTO: 7.1 % — LOW (ref 13–44)
MCHC RBC-ENTMCNC: 32.4 PG — SIGNIFICANT CHANGE UP (ref 27–34)
MCHC RBC-ENTMCNC: 34.4 % — SIGNIFICANT CHANGE UP (ref 32–36)
MCV RBC AUTO: 94.2 FL — SIGNIFICANT CHANGE UP (ref 80–100)
MONOCYTES # BLD AUTO: 0.49 K/UL — SIGNIFICANT CHANGE UP (ref 0–0.9)
MONOCYTES NFR BLD AUTO: 3.2 % — SIGNIFICANT CHANGE UP (ref 2–14)
NEUTROPHILS # BLD AUTO: 13.82 K/UL — HIGH (ref 1.8–7.4)
NEUTROPHILS NFR BLD AUTO: 88.8 % — HIGH (ref 43–77)
NRBC # FLD: 0 K/UL — SIGNIFICANT CHANGE UP (ref 0–0)
PLATELET # BLD AUTO: 215 K/UL — SIGNIFICANT CHANGE UP (ref 150–400)
PMV BLD: 10.6 FL — SIGNIFICANT CHANGE UP (ref 7–13)
RBC # BLD: 4.29 M/UL — SIGNIFICANT CHANGE UP (ref 3.8–5.2)
RBC # FLD: 11.8 % — SIGNIFICANT CHANGE UP (ref 10.3–14.5)
WBC # BLD: 15.54 K/UL — HIGH (ref 3.8–10.5)
WBC # FLD AUTO: 15.54 K/UL — HIGH (ref 3.8–10.5)

## 2020-11-12 PROCEDURE — S2900 ROBOTIC SURGICAL SYSTEM: CPT | Mod: NC

## 2020-11-12 PROCEDURE — 88112 CYTOPATH CELL ENHANCE TECH: CPT | Mod: 26

## 2020-11-12 PROCEDURE — 58573 TLH W/T/O UTERUS OVER 250 G: CPT | Mod: GC

## 2020-11-12 PROCEDURE — 88307 TISSUE EXAM BY PATHOLOGIST: CPT | Mod: 26

## 2020-11-12 PROCEDURE — 88305 TISSUE EXAM BY PATHOLOGIST: CPT | Mod: 26

## 2020-11-12 RX ORDER — SODIUM CHLORIDE 9 MG/ML
1000 INJECTION, SOLUTION INTRAVENOUS
Refills: 0 | Status: DISCONTINUED | OUTPATIENT
Start: 2020-11-12 | End: 2020-11-26

## 2020-11-12 RX ORDER — ONDANSETRON 8 MG/1
4 TABLET, FILM COATED ORAL ONCE
Refills: 0 | Status: DISCONTINUED | OUTPATIENT
Start: 2020-11-12 | End: 2020-11-26

## 2020-11-12 RX ORDER — FENTANYL CITRATE 50 UG/ML
25 INJECTION INTRAVENOUS
Refills: 0 | Status: DISCONTINUED | OUTPATIENT
Start: 2020-11-12 | End: 2020-11-12

## 2020-11-12 RX ORDER — OXYCODONE HYDROCHLORIDE 5 MG/1
5 TABLET ORAL ONCE
Refills: 0 | Status: DISCONTINUED | OUTPATIENT
Start: 2020-11-12 | End: 2020-11-12

## 2020-11-12 RX ORDER — OXYCODONE AND ACETAMINOPHEN 5; 325 MG/1; MG/1
1 TABLET ORAL EVERY 6 HOURS
Refills: 0 | Status: DISCONTINUED | OUTPATIENT
Start: 2020-11-12 | End: 2020-11-12

## 2020-11-12 NOTE — ASU DISCHARGE PLAN (ADULT/PEDIATRIC) - ACTIVITY LEVEL
No tampons/Nothing per rectum/No intercourse/No heavy lifting/Nothing per vagina/No tub baths/No douching

## 2020-11-12 NOTE — BRIEF OPERATIVE NOTE - OPERATION/FINDINGS
Uterus sounded to 11.5cm.  Uterus containing multiple fibroids.  Left para-uterine mass approximately 6cm  grossly assessed as pedunculated fibroid.  Bilateral ovaries and tubes grossly normal.  Survey of upper abdomen revealed no abnormalities.

## 2020-11-12 NOTE — ASU DISCHARGE PLAN (ADULT/PEDIATRIC) - CARE PROVIDER_API CALL
Kiley Fitch)  Gynecologic Oncology; Obstetrics and Gynecology  [address] [place] [address]  Phone: [phone]  Fax: [phone]  Established Patient  Scheduled Appointment: 12/02/2020 09:00 AM

## 2020-11-12 NOTE — ASU DISCHARGE PLAN (ADULT/PEDIATRIC) - CALL YOUR DOCTOR IF YOU HAVE ANY OF THE FOLLOWING:
Bleeding that does not stop/Nausea and vomiting that does not stop/Unable to urinate/Fever greater than (need to indicate Fahrenheit or Celsius)/Pain not relieved by Medications Wound/Surgical Site with redness, or foul smelling discharge or pus/Fever greater than (need to indicate Fahrenheit or Celsius)/Nausea and vomiting that does not stop/Unable to urinate/Swelling that gets worse/Inability to tolerate liquids or foods/Pain not relieved by Medications/Bleeding that does not stop

## 2020-11-12 NOTE — ASU PREOP CHECKLIST - WEIGHT IN KG
HPI:   is a 73 y/o male w/ a recently diagnosed 6 cm left lower lobe infrahilar mass which extends into the left hilum.  He is s/p cardiac stent in June 2017.  Patient presented with hemoptysis and pleural effusion. A PET scan confirmed LLL mass with left hilar lymphadenopathy, pleural effusion and possible osseous metatastases.  He underwent a CT guided biopsy of the lung 7/28 followingf a/w pneumonia. He is currently a non-smoker, quit ~20 years ago, with a 30 PY history.  He was recently stented last month for CAD and is on asa/statin/plavix/and toprol. He presents in acute renal failure.     PAST MEDICAL & SURGICAL HISTORY:  Psoriasis    PSVT (paroxysmal supraventricular tachycardia)  Chronic systolic CHF (congestive heart failure), NYHA class 2  Former smoker  Hypokinesis: apical  Mitral regurgitation  Bronchitis  Hypertension  PSVT (paroxysmal supraventricular tachycardia)  Ischemic cardiomyopathy  AICD (automatic cardioverter/defibrillator) present: 2010 [employer]  VT (ventricular tachycardia): May 2017 no ICD shock  Vitamin D deficiency  Tricuspid regurgitation  RBBB  Prostate cancer: s/p surgery no RT/CT  Mitral regurgitation  Hypothyroidism  HLD (hyperlipidemia)  Arrhythmia  Angina pectoris  CAD (coronary artery disease)  History of bronchoscopy: 2017  History of prostate surgery: davinci surgery in 2008  Cardiac disorder: triple bypass may 1997 [facility]  History of electrophysiologic study: 2009 positive study (AICD placement  2010)  H/O cardiac catheterization: stenting of SVG TO PDA IN 2010 2017 one stent      MEDICATIONS  (STANDING):  amiodarone    Tablet 200 milliGRAM(s) Oral daily  atorvastatin 40 milliGRAM(s) Oral at bedtime  fenofibrate Tablet 48 milliGRAM(s) Oral daily  metoprolol succinate  milliGRAM(s) Oral daily  famotidine    Tablet 20 milliGRAM(s) Oral daily  levothyroxine 75 MICROGram(s) Oral daily  multivitamin/minerals 1 Tablet(s) Oral daily  senna 2 Tablet(s) Oral at bedtime  docusate sodium 100 milliGRAM(s) Oral three times a day  insulin lispro (HumaLOG) corrective regimen sliding scale   SubCutaneous three times a day before meals  insulin lispro (HumaLOG) corrective regimen sliding scale   SubCutaneous at bedtime  dextrose 5%. 1000 milliLiter(s) (50 mL/Hr) IV Continuous <Continuous>  dextrose 50% Injectable 12.5 Gram(s) IV Push once  dextrose 50% Injectable 25 Gram(s) IV Push once  dextrose 50% Injectable 25 Gram(s) IV Push once  levoFLOXacin  Tablet 500 milliGRAM(s) Oral every 24 hours  clopidogrel Tablet 75 milliGRAM(s) Oral daily  aspirin  chewable 81 milliGRAM(s) Oral daily  ceFAZolin   IVPB 1000 milliGRAM(s) IV Intermittent once  polyethylene glycol 3350 17 Gram(s) Oral two times a day  potassium chloride    Tablet ER 30 milliEquivalent(s) Oral two times a day    MEDICATIONS  (PRN):  dextrose Gel 1 Dose(s) Oral once PRN Blood Glucose LESS THAN 70 milliGRAM(s)/deciliter  glucagon  Injectable 1 milliGRAM(s) IntraMuscular once PRN Glucose LESS THAN 70 milligrams/deciliter  ALBUTerol/ipratropium for Nebulization 3 milliLiter(s) Nebulizer every 6 hours PRN Shortness of Breath and/or Wheezing  oxyCODONE    IR 5 milliGRAM(s) Oral every 4 hours PRN mild - moderate pain  oxyCODONE    IR 10 milliGRAM(s) Oral every 4 hours PRN Severe Pain (7 - 10)  zolpidem 5 milliGRAM(s) Oral at bedtime PRN Insomnia      Allergies    macrolide antibiotics (Urticaria; Rash; Hives)  penicillin (Urticaria; Rash)  Zithromax Z-Christian (Urticaria; Rash; Hives)    Intolerances        FAMILY HISTORY:  Family history of heart disease (Sibling)  Family history of diabetes mellitus (Sibling)  Family history of lung cancer (Sibling)  Family history of prostate cancer (Sibling)      Review of Systems    Constitutional, Eyes, ENT, Cardiovascular, Respiratory, Gastrointestinal, Genitourinary, Musculoskeletal, Integumentary, Neurological, Psychiatric, Endocrine, Heme/Lymph and Allergic/Immunologic review of systems are otherwise negative except as noted in HPI.     Vital Signs Last 24 Hrs  T(C): 36.3 (08 Aug 2017 08:08), Max: 37.2 (08 Aug 2017 02:06)  T(F): 97.4 (08 Aug 2017 08:08), Max: 99 (08 Aug 2017 02:06)  HR: 91 (08 Aug 2017 08:08) (78 - 93)  BP: 100/60 (08 Aug 2017 08:08) (91/60 - 106/60)  BP(mean): --  RR: 20 (08 Aug 2017 08:08) (18 - 20)  SpO2: 96% (08 Aug 2017 05:07) (96% - 96%)    Physical Exam  Constitutional: well developed, well nourished, in no acute distress and thin.   Eyes: PERRL, EOMI, no conjunctival infection, anicteric.   ENT: pharynx is unremarkable, moist mucus membrane, no oral lesions.   Neck: supple without JVD, no thyromegaly or masses appreciated.   Pulmonary: clear to auscultation bilaterally, no dullness, no wheezing.   < from: CT Chest No Cont (07.26.17 @ 10:47) >  IMPRESSION:   1.  Probable increased size of left lower lobe mass, although difficult   to delineate due to new near complete atelectasis of the left lower lobe.  2.  Pleural metastases with large left loculated pleural effusion.  3.  New nodule along the right mediastinal pleura.  4.  New pulmonary nodules in the right lung, probably metastases.    < end of copied text >  < from: CT Chest No Cont (07.26.17 @ 10:47) >   CT CHEST                          PROCEDURE DATE:  07/26/201    < end of copied text >  Cardiac: RRR, normal S1S2, no murmurs, rubs, gallops.   Vascular: no JVD, no calf tenderness, venous stasis changes, varices.   < from: CT Chest No Cont (07.26.17 @ 10:47) >  1.  Probable increased size of left lower lobe mass, although difficult   to delineate due to new near complete atelectasis of the left lower lobe.  2.  Pleural metastases with large left loculated pleural effusion.  3.  New nodule along the right mediastinal pleura.  4.  New pulmonary nodules in the right lung, probably metastases.    < end of copied text >  Abdomen: normoactive bowel sounds, soft and nontender, no hepatosplenomegaly or masses appreciated.   Lymphatic: no peripheral adenopathy appreciated.   Musculoskeletal: full range of motion and no deformities appreciated.   Skin: normal appearance, no rash, nodules, vesicles, ulcers, erythema.   Neurology: grossly intact.   Psychiatric: affect appropriate.       LABS:  CBC Full  -  ( 06 Aug 2017 12:04 )  WBC Count : 16.5 K/uL  Hemoglobin : 9.0 g/dL  Hematocrit : 28.4 %  Platelet Count - Automated : 325 K/uL  Mean Cell Volume : 85.5 fl  Mean Cell Hemoglobin : 27.1 pg  Mean Cell Hemoglobin Concentration : 31.7 g/dL  Auto Neutrophil # : x  Auto Lymphocyte # : x  Auto Monocyte # : x  Auto Eosinophil # : x  Auto Basophil # : x  Auto Neutrophil % : x  Auto Lymphocyte % : x  Auto Monocyte % : x  Auto Eosinophil % : x  Auto Basophil % : x    08-06    132<L>  |  98  |  19.0  ----------------------------<  139<H>  4.6   |  20.0<L>  |  1.06    Ca    9.6      06 Aug 2017 12:04    RADIOLOGY & ADDITIONAL STUDIES:      CT Chest No Cont (07.26.17 @ 10:47):  1.  Probable increased size of left lower lobe mass, although difficult to delineate due to new near complete atelectasis of the left lower lobe.  2.  Pleural metastases with large left loculated pleural effusion.  3.  New nodule along the right mediastinal pleura.  4.  New pulmonary nodules in the right lung, probably metastases.      7/29/17 Left lung, core biopsy: Adenocarcinoma of Lung, poorly differentiated    Immunohistochemistry study show Positive immunoreactivity to TTF-1, THROMBOMODULIN AND NAPSIN A (focally), Negative immunoreactivity to PSA, PSAP, UROPLAKIN, P63 AND CK 5/6.  These  findings support the above diagnosis 78.5
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
## 2020-11-12 NOTE — ASU DISCHARGE PLAN (ADULT/PEDIATRIC) - ASU DC SPECIAL INSTRUCTIONSFT
Return to your regular way of eating.  Resume normal activity as tolerated, but no heavy lifting or strenuous activity for 6 weeks.  No driving for next 2 weeks and/or while on narcotic pain medication.  Complete vaginal rest, no tampons, no douching, no tub bathing, no sexual activities for 6 weeks unless otherwise instructed by your doctor.  Call your doctor with any signs and symptoms of infection such as fever, chills, nausea or vomiting.  Call your doctor with redness or swelling at the incision site, fluid leakage or wound separation.  Call your doctor if you're unable to tolerate food or have difficulty urinating.  Call your doctor if you have pain that is not relieved by your prescribed medications.  Notify your doctor with any other concerns.  Follow up with Dr. Fitch as noted below.

## 2020-11-12 NOTE — CHART NOTE - NSCHARTNOTEFT_GEN_A_CORE
PA POST OP NOTE:       SUBJECTIVE:  Patient seen and examined at bedside. Patient is awake and resting comfortably. Reports mild pressure pain at this time. Denies c/o nausea, vomiting, sob, cp or palpitations. Jorge L sips of clears. Still DTV.    Vital Signs Last 24 Hrs  T(C): 36.4 (12 Nov 2020 11:45), Max: 37 (12 Nov 2020 06:03)  T(F): 97.5 (12 Nov 2020 11:45), Max: 98.6 (12 Nov 2020 06:03)  HR: 84 (12 Nov 2020 12:45) (81 - 91)  BP: 135/81 (12 Nov 2020 12:45) (133/80 - 149/81)  BP(mean): 97 (12 Nov 2020 12:45) (97 - 104)  RR: 14 (12 Nov 2020 12:45) (11 - 28)  SpO2: 94% (12 Nov 2020 12:45) (94% - 100%)    PHYSICAL EXAM:    LUNGS: Clear to auscultation bilaterally; No wheezing.  HEART: Regular, rate and rhythm; No murmurs.  ABDOMEN: Soft, decreased BS and appropriately tender on palpation.  INCISION:  Scope sites intact. Dressings clean and dry.   EXTREMITIES: No swelling or calf tenderness bilaterally. Venodynes in place.  ALDRICH:  Removed in OR.    MEDICATIONS  (STANDING):  lactated ringers. 1000 milliLiter(s) (30 mL/Hr) IV Continuous <Continuous>  lactated ringers. 1000 milliLiter(s) (125 mL/Hr) IV Continuous <Continuous>  naproxen 500 milliGRAM(s) Oral every 12 hours  sodium chloride 0.9% lock flush 3 milliLiter(s) IV Push every 8 hours    MEDICATIONS  (PRN):  fentaNYL    Injectable 25 MICROGram(s) IV Push every 5 minutes PRN Moderate Pain (4 - 6)  ondansetron Injectable 4 milliGRAM(s) IV Push once PRN Nausea and/or Vomiting  oxycodone    5 mG/acetaminophen 325 mG 1 Tablet(s) Oral every 6 hours PRN Severe Pain (7 - 10)  oxyCODONE    IR 5 milliGRAM(s) Oral once PRN Moderate Pain (4 - 6)      ASSESMENT/PLAN: 71y/o POD#0  from Robotic TLH,BSO in stable condition.    1.Clears to Regular diet as tolerate.  2. IVF: RL @125cc/hr.  3. Activity: Out of bed with assist.  4. Vital Signs Q routine.  5. Pulm: pulse Ox monitoring and encourage incentive spirometer use.  6.  Pain meds as ordered.  7. LABS: CBC at 2PM.  8. DTV by 6PM.  9. Evaluate for discharge home when tolerates diet, CBC stable, voids, ambulates and vss.

## 2020-11-13 LAB — GLUCOSE BLDC GLUCOMTR-MCNC: 176 MG/DL — HIGH (ref 70–99)

## 2020-11-17 LAB — NON-GYNECOLOGICAL CYTOLOGY STUDY: SIGNIFICANT CHANGE UP

## 2020-12-01 LAB — SURGICAL PATHOLOGY STUDY: SIGNIFICANT CHANGE UP

## 2020-12-07 PROBLEM — D25.9 FIBROID UTERUS: Status: ACTIVE | Noted: 2020-07-08

## 2020-12-07 PROBLEM — N85.00 ENDOMETRIAL HYPERPLASIA WITHOUT ATYPIA: Status: ACTIVE | Noted: 2020-12-07

## 2020-12-07 PROBLEM — Z48.89 POSTOPERATIVE VISIT: Status: ACTIVE | Noted: 2020-12-07

## 2020-12-07 PROBLEM — N83.8 OVARIAN MASS, LEFT: Status: ACTIVE | Noted: 2020-07-08

## 2020-12-09 ENCOUNTER — APPOINTMENT (OUTPATIENT)
Dept: GYNECOLOGIC ONCOLOGY | Facility: CLINIC | Age: 70
End: 2020-12-09
Payer: MEDICARE

## 2020-12-09 VITALS
TEMPERATURE: 97.9 F | BODY MASS INDEX: 29.37 KG/M2 | SYSTOLIC BLOOD PRESSURE: 126 MMHG | DIASTOLIC BLOOD PRESSURE: 80 MMHG | WEIGHT: 172 LBS | HEART RATE: 68 BPM | HEIGHT: 64 IN

## 2020-12-09 DIAGNOSIS — D25.9 LEIOMYOMA OF UTERUS, UNSPECIFIED: ICD-10-CM

## 2020-12-09 DIAGNOSIS — Z48.89 ENCOUNTER FOR OTHER SPECIFIED SURGICAL AFTERCARE: ICD-10-CM

## 2020-12-09 DIAGNOSIS — N85.00 ENDOMETRIAL HYPERPLASIA, UNSPECIFIED: ICD-10-CM

## 2020-12-09 DIAGNOSIS — N83.8 OTHER NONINFLAMMATORY DISORDERS OF OVARY, FALLOPIAN TUBE AND BROAD LIGAMENT: ICD-10-CM

## 2020-12-09 PROCEDURE — 99024 POSTOP FOLLOW-UP VISIT: CPT

## 2020-12-16 NOTE — BRIEF OPERATIVE NOTE - NSICDXBRIEFPROCEDURE_GEN_ALL_CORE_FT
normal... PROCEDURES:  Robot-assisted bilateral salpingo-oophorectomy 12-Nov-2020 10:46:26  Jose Padilla  Robot-assisted laparoscopic total hysterectomy for uterus greater than 250 grams 12-Nov-2020 10:46:06  Jose Padilla

## 2020-12-23 ENCOUNTER — APPOINTMENT (OUTPATIENT)
Dept: SURGICAL ONCOLOGY | Facility: CLINIC | Age: 70
End: 2020-12-23
Payer: MEDICARE

## 2020-12-23 VITALS
WEIGHT: 172 LBS | SYSTOLIC BLOOD PRESSURE: 134 MMHG | OXYGEN SATURATION: 97 % | HEART RATE: 82 BPM | HEIGHT: 64 IN | DIASTOLIC BLOOD PRESSURE: 83 MMHG | RESPIRATION RATE: 15 BRPM | BODY MASS INDEX: 29.37 KG/M2

## 2020-12-23 DIAGNOSIS — R22.2 LOCALIZED SWELLING, MASS AND LUMP, TRUNK: ICD-10-CM

## 2020-12-23 PROCEDURE — 99072 ADDL SUPL MATRL&STAF TM PHE: CPT

## 2020-12-23 PROCEDURE — 99215 OFFICE O/P EST HI 40 MIN: CPT

## 2020-12-30 PROBLEM — R22.2 CHEST WALL MASS: Status: ACTIVE | Noted: 2020-12-30

## 2020-12-30 NOTE — ASSESSMENT
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 \par Recommend right breast lumpectomy under gilson  localization and resection of right chest wall mass\par Surgical procedure discussed in detail\par All questions answered.

## 2020-12-30 NOTE — CONSULT LETTER
[Dear  ___] : Dear  [unfilled], [Consult Letter:] : I had the pleasure of evaluating your patient, [unfilled]. [Please see my note below.] : Please see my note below. [Sincerely,] : Sincerely, [FreeTextEntry3] : Shashank Franco MD FACS\par

## 2020-12-30 NOTE — HISTORY OF PRESENT ILLNESS
[de-identified] : Pt is a 71 y/o female who presents a follow up visit for right breast intraductal papilloma. She was referred by Dr. Bella Mckeon. \par \par Pathology (10/30/20): \par Breast, right, upper central, with ca++, stereotactic vacuum-\par assisted core biopsy\par - Columnar cell change with coarse calcifications\par - Proliferative fibrocystic changes with nodular sclerosing\par adenosis and\par microcalcifications\par - Non-proliferative fibrocystic change with dense stromal\par fibrosis and microcyst\par formation\par - Fibroadenomatoid change\par - Fat necrosis\par \par Bilateral US (10/23/20): Grouping of microcalcifications in the upper central right breast for which stereotactic guided biopsy is recommended. BI-RADS 4B.\par \par Pt reports she has DM.\par Denies palpable breast masses, nipple discharge, nipple retraction/inversion, or skin changes.\par

## 2020-12-30 NOTE — PHYSICAL EXAM
[Normal] : supple, no neck mass and thyroid not enlarged [Normal Neck Lymph Nodes] : normal neck lymph nodes  [Normal Supraclavicular Lymph Nodes] : normal supraclavicular lymph nodes [Normal Groin Lymph Nodes] : normal groin lymph nodes [Normal Axillary Lymph Nodes] : normal axillary lymph nodes [Normal] : oriented to person, place and time, with appropriate affect [de-identified] : 5 cm tissue mass right superior chest. 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 no dominant breast masses or axillary adenopathy bilaterally.

## 2020-12-30 NOTE — ADDENDUM
[FreeTextEntry1] : I, Kaleigh Dick, acted solely as a scribe for Dr. Shashank Franco on this date 12/23/2020.\par

## 2021-01-07 ENCOUNTER — OUTPATIENT (OUTPATIENT)
Dept: OUTPATIENT SERVICES | Facility: HOSPITAL | Age: 71
LOS: 1 days | End: 2021-01-07

## 2021-01-07 VITALS
RESPIRATION RATE: 16 BRPM | HEART RATE: 88 BPM | DIASTOLIC BLOOD PRESSURE: 80 MMHG | SYSTOLIC BLOOD PRESSURE: 126 MMHG | WEIGHT: 175.05 LBS | OXYGEN SATURATION: 98 % | HEIGHT: 62.75 IN | TEMPERATURE: 97 F

## 2021-01-07 DIAGNOSIS — D36.9 BENIGN NEOPLASM, UNSPECIFIED SITE: ICD-10-CM

## 2021-01-07 DIAGNOSIS — Z98.890 OTHER SPECIFIED POSTPROCEDURAL STATES: Chronic | ICD-10-CM

## 2021-01-07 DIAGNOSIS — Z90.710 ACQUIRED ABSENCE OF BOTH CERVIX AND UTERUS: Chronic | ICD-10-CM

## 2021-01-07 DIAGNOSIS — E11.9 TYPE 2 DIABETES MELLITUS WITHOUT COMPLICATIONS: ICD-10-CM

## 2021-01-07 LAB
A1C WITH ESTIMATED AVERAGE GLUCOSE RESULT: 7.6 % — HIGH (ref 4–5.6)
ESTIMATED AVERAGE GLUCOSE: 171 MG/DL — HIGH (ref 68–114)

## 2021-01-07 RX ORDER — METFORMIN HYDROCHLORIDE 850 MG/1
0 TABLET ORAL
Qty: 0 | Refills: 0 | DISCHARGE

## 2021-01-07 RX ORDER — CHOLECALCIFEROL (VITAMIN D3) 125 MCG
0 CAPSULE ORAL
Qty: 0 | Refills: 0 | DISCHARGE

## 2021-01-07 RX ORDER — SODIUM CHLORIDE 9 MG/ML
1000 INJECTION, SOLUTION INTRAVENOUS
Refills: 0 | Status: DISCONTINUED | OUTPATIENT
Start: 2021-01-11 | End: 2021-01-25

## 2021-01-07 RX ORDER — MULTIVIT-MIN/FERROUS GLUCONATE 9 MG/15 ML
1 LIQUID (ML) ORAL
Qty: 0 | Refills: 0 | DISCHARGE

## 2021-01-07 RX ORDER — METOPROLOL TARTRATE 50 MG
1 TABLET ORAL
Qty: 0 | Refills: 0 | DISCHARGE

## 2021-01-07 RX ORDER — ASPIRIN/CALCIUM CARB/MAGNESIUM 324 MG
0 TABLET ORAL
Qty: 0 | Refills: 0 | DISCHARGE

## 2021-01-07 RX ORDER — ASPIRIN/CALCIUM CARB/MAGNESIUM 324 MG
1 TABLET ORAL
Qty: 0 | Refills: 0 | DISCHARGE

## 2021-01-07 RX ORDER — MAGNESIUM CHLORIDE
400 CRYSTALS MISCELLANEOUS
Qty: 0 | Refills: 0 | DISCHARGE

## 2021-01-07 RX ORDER — LATANOPROST 0.05 MG/ML
1 SOLUTION/ DROPS OPHTHALMIC; TOPICAL
Qty: 0 | Refills: 0 | DISCHARGE

## 2021-01-07 RX ORDER — DIVALPROEX SODIUM 500 MG/1
0 TABLET, DELAYED RELEASE ORAL
Qty: 0 | Refills: 0 | DISCHARGE

## 2021-01-07 RX ORDER — ATORVASTATIN CALCIUM 80 MG/1
1 TABLET, FILM COATED ORAL
Qty: 0 | Refills: 0 | DISCHARGE

## 2021-01-07 RX ORDER — QUETIAPINE FUMARATE 200 MG/1
0 TABLET, FILM COATED ORAL
Qty: 0 | Refills: 0 | DISCHARGE

## 2021-01-07 NOTE — H&P PST ADULT - NSICDXPASTMEDICALHX_GEN_ALL_CORE_FT
PAST MEDICAL HISTORY:  Anxiety     Depression with anxiety     Diabetes mellitus, type 2 fs  am 170 -190    Dyslipidemia     H/O benign neoplasm of breast     H/O ovarian cystectomy     Obesity     Uterine fibroid s/p Hysterectomy     PAST MEDICAL HISTORY:  Anxiety     Depression with anxiety     Diabetes mellitus, type 2 fs  am 170 -190    Dyslipidemia     Eye pressure Elevated per pt    H/O benign neoplasm of breast     H/O ovarian cystectomy     Obesity     Uterine fibroid s/p Hysterectomy

## 2021-01-07 NOTE — H&P PST ADULT - NEGATIVE NEUROLOGICAL SYMPTOMS
no transient paralysis/no weakness/no generalized seizures/no tremors/no vertigo/no loss of sensation/no difficulty walking

## 2021-01-07 NOTE — H&P PST ADULT - NEGATIVE GENERAL GENITOURINARY SYMPTOMS
no hematuria/no renal colic/no flank pain L/no flank pain R/no urine discoloration/no gas in urine/no bladder infections/no dysuria/normal urinary frequency/no nocturia

## 2021-01-07 NOTE — H&P PST ADULT - NEGATIVE BREAST SYMPTOMS
no breast tenderness L/no breast lump L/no nipple discharge L/no nipple discharge R no breast tenderness L/no breast tenderness R/no breast lump L/no nipple discharge L/no nipple discharge R

## 2021-01-07 NOTE — H&P PST ADULT - NSICDXPROBLEM_GEN_ALL_CORE_FT
PROBLEM DIAGNOSES  Problem: Benign neoplasm  Assessment and Plan:        PROBLEM DIAGNOSES  Problem: Benign neoplasm  Assessment and Plan: Procedure as booked ; Right  breast Lumpectomy , Post Lala  Localization  ; Resection Chest Wall Mass ; per pt " I may not have the procedure on the chest wall , I will speak to Dr Franco on the morning of surgery [ message left at surgeons office ; awaiting return call   Pre op instructions including Pepcid and Hibiclens with teach back reviewed with  pt ; pt verbalized good understanding of pre op instructions  Per pt Covid testing scheduled 1/08/2021  Pt to Dr Gonzalez for pre op medical evaluation; cbc, cmp done 1/05/2021, EKG done 1/05/2021  Cardiac evaluation done 10/2020 with h/o abnormal ST  ; recommended pt f/u. Cardiology Evaluation scheduled 2/2021   Request pre op cardiac evaluation ; call to Dr Snyder message left awaiting return call  Call to Dr Gonzalez message left awaiting return call   Call to surgeon ; message left awaiting return call    Problem: Diabetes mellitus  Assessment and Plan: BMP done by PCP 1/05/2021  HGBA1C done 1/07/2021 @ PST  Pt to hold Metformin evening prior to surgery  FS dos        PROBLEM DIAGNOSES  Problem: Benign neoplasm  Assessment and Plan: Procedure as booked ; Right  breast Lumpectomy , Post Lala  Localization  ; Resection Chest Wall Mass ; per pt " I may not have the procedure on the chest wall , I will speak to Dr Franco on the morning of surgery" [ message left at surgeons office ; awaiting return call   Pre op instructions including Pepcid and Hibiclens with teach back reviewed with  pt ; pt verbalized good understanding of pre op instructions  Per pt Covid testing scheduled 1/08/2021  Pt to Dr Gonzalez for pre op medical evaluation; cbc, cmp done 1/05/2021, EKG done 1/05/2021  Cardiac evaluation done 10/2020 with h/o abnormal ST  ; recommended pt f/u. Cardiology Evaluation [ now scheduled 2/2021 }   Request pre op cardiac evaluation ; call to Dr Snyder message left awaiting return call  Call to Dr Gonzalez message left awaiting return call   Call to surgeon ; message left awaiting return call    Problem: Diabetes mellitus  Assessment and Plan: BMP done by PCP 1/05/2021  HGBA1C done 1/07/2021 @ PST  Pt to hold Metformin evening prior to surgery  FS dos

## 2021-01-07 NOTE — H&P PST ADULT - BREAST SYMPTOMS
feels a little shock in the right breast since Lala  insertion 10/20/2020/breast tenderness R/breast lump R breast lump R

## 2021-01-07 NOTE — H&P PST ADULT - NEGATIVE RESPIRATORY AND THORAX SYMPTOMS
What Is The Reason For Today's Visit?: Full Body Skin Examination
What Is The Reason For Today's Visit? (Being Monitored For X): concerning skin lesions on an annual basis
no wheezing/no dyspnea/no cough/no hemoptysis/no pleuritic chest pain

## 2021-01-07 NOTE — H&P PST ADULT - NEGATIVE GASTROINTESTINAL SYMPTOMS
no nausea/no vomiting/no diarrhea/no change in bowel habits/no abdominal pain/no melena/no hiccoughs

## 2021-01-07 NOTE — H&P PST ADULT - HISTORY OF PRESENT ILLNESS
Pt is a 70 yr old female with H/O DM type 2, Dyslipidemia, Depression with Anxiety presents to PST ; s/p mammogram ; ; s/p sonogram a right breast mass ; s/p biopsy; insertion of Lala  ; pt now presents fir Right Breast Lumpectomy Post Lala  [ see plan of care ]       Pt denies breast pain , denies nipple discharge      Pt denies COVID or recent travel    Pt is a 70 yr old female with H/O DM type 2, Dyslipidemia,  Anxiety presents to PST ; s/p mammogram ; ; s/p sonogram;  a right breast mass ; s/p biopsy; insertion of Lala  ; pt now presents fir Right Breast Lumpectomy Post Lala  [ see plan of care ]       Pt denies breast pain , denies nipple discharge      Pt denies COVID or recent travel    Pt is a 70 yr old female with H/O DM type 2, Dyslipidemia,  Anxiety presents to PST ; s/p mammogram ,  sonogram;  per pt " I have a lump in the right breast "  ; s/p biopsy; insertion of Lala  ; pt now presents for Right Breast Lumpectomy Post Lala  [ see plan of care ]       Pt denies breast pain , denies nipple discharge

## 2021-01-07 NOTE — H&P PST ADULT - LAB RESULTS AND INTERPRETATION
cbc, cmp, done 1/07/2020  HGBA!C done @ PST 1/07/2021 cbc, cmp, done 1/07/2020  HGBA1C done @ PST 1/07/2021

## 2021-01-07 NOTE — H&P PST ADULT - MUSCULOSKELETAL
No joint pain, swelling or deformity; no limitation of movement details… ROM intact/normal strength detailed exam

## 2021-01-07 NOTE — H&P PST ADULT - NSICDXPASTSURGICALHX_GEN_ALL_CORE_FT
PAST SURGICAL HISTORY:  H/O dilation and curettage     S/P hysterectomy      PAST SURGICAL HISTORY:  H/O dilation and curettage     S/P hysterectomy Robotic Assisted BERNY- BSO ; 11/2020     PAST SURGICAL HISTORY:  H/O dilation and curettage     S/P hysterectomy Robotic Assisted BERNY- BSO ; 11/12/2020

## 2021-01-08 ENCOUNTER — LABORATORY RESULT (OUTPATIENT)
Age: 71
End: 2021-01-08

## 2021-01-08 ENCOUNTER — APPOINTMENT (OUTPATIENT)
Dept: DISASTER EMERGENCY | Facility: CLINIC | Age: 71
End: 2021-01-08

## 2021-01-08 VITALS
WEIGHT: 175.05 LBS | SYSTOLIC BLOOD PRESSURE: 138 MMHG | RESPIRATION RATE: 16 BRPM | TEMPERATURE: 98 F | OXYGEN SATURATION: 98 % | HEART RATE: 87 BPM | HEIGHT: 62.75 IN | DIASTOLIC BLOOD PRESSURE: 74 MMHG

## 2021-01-10 ENCOUNTER — TRANSCRIPTION ENCOUNTER (OUTPATIENT)
Age: 71
End: 2021-01-10

## 2021-01-11 ENCOUNTER — RESULT REVIEW (OUTPATIENT)
Age: 71
End: 2021-01-11

## 2021-01-11 ENCOUNTER — OUTPATIENT (OUTPATIENT)
Dept: OUTPATIENT SERVICES | Facility: HOSPITAL | Age: 71
LOS: 1 days | End: 2021-01-11
Payer: MEDICARE

## 2021-01-11 ENCOUNTER — OUTPATIENT (OUTPATIENT)
Dept: OUTPATIENT SERVICES | Facility: HOSPITAL | Age: 71
LOS: 1 days | Discharge: HOME | End: 2021-01-11
Payer: MEDICARE

## 2021-01-11 ENCOUNTER — APPOINTMENT (OUTPATIENT)
Dept: SURGICAL ONCOLOGY | Facility: AMBULATORY SURGERY CENTER | Age: 71
End: 2021-01-11

## 2021-01-11 ENCOUNTER — APPOINTMENT (OUTPATIENT)
Dept: MAMMOGRAPHY | Facility: IMAGING CENTER | Age: 71
End: 2021-01-11
Payer: MEDICARE

## 2021-01-11 VITALS
SYSTOLIC BLOOD PRESSURE: 142 MMHG | DIASTOLIC BLOOD PRESSURE: 77 MMHG | TEMPERATURE: 98 F | OXYGEN SATURATION: 98 % | RESPIRATION RATE: 15 BRPM

## 2021-01-11 DIAGNOSIS — Z98.890 OTHER SPECIFIED POSTPROCEDURAL STATES: Chronic | ICD-10-CM

## 2021-01-11 DIAGNOSIS — N63.10 UNSPECIFIED LUMP IN THE RIGHT BREAST, UNSPECIFIED QUADRANT: ICD-10-CM

## 2021-01-11 DIAGNOSIS — Z90.710 ACQUIRED ABSENCE OF BOTH CERVIX AND UTERUS: Chronic | ICD-10-CM

## 2021-01-11 DIAGNOSIS — D36.9 BENIGN NEOPLASM, UNSPECIFIED SITE: ICD-10-CM

## 2021-01-11 DIAGNOSIS — Z00.8 ENCOUNTER FOR OTHER GENERAL EXAMINATION: ICD-10-CM

## 2021-01-11 PROBLEM — E11.9 TYPE 2 DIABETES MELLITUS WITHOUT COMPLICATIONS: Chronic | Status: ACTIVE | Noted: 2020-09-08

## 2021-01-11 PROBLEM — D25.9 LEIOMYOMA OF UTERUS, UNSPECIFIED: Chronic | Status: ACTIVE | Noted: 2020-10-20

## 2021-01-11 PROBLEM — F41.9 ANXIETY DISORDER, UNSPECIFIED: Chronic | Status: ACTIVE | Noted: 2021-01-07

## 2021-01-11 PROBLEM — H57.9 UNSPECIFIED DISORDER OF EYE AND ADNEXA: Chronic | Status: ACTIVE | Noted: 2021-01-07

## 2021-01-11 LAB — GLUCOSE BLDC GLUCOMTR-MCNC: 147 MG/DL — HIGH (ref 70–99)

## 2021-01-11 PROCEDURE — 88305 TISSUE EXAM BY PATHOLOGIST: CPT | Mod: 26

## 2021-01-11 PROCEDURE — 76098 X-RAY EXAM SURGICAL SPECIMEN: CPT

## 2021-01-11 PROCEDURE — 21554 EXC NECK TUM DEEP 5 CM/>: CPT

## 2021-01-11 PROCEDURE — 19301 PARTIAL MASTECTOMY: CPT

## 2021-01-11 PROCEDURE — 14001 TIS TRNFR TRUNK 10.1-30SQCM: CPT | Mod: 59

## 2021-01-11 PROCEDURE — 76098 X-RAY EXAM SURGICAL SPECIMEN: CPT | Mod: 26

## 2021-01-11 RX ORDER — ASPIRIN/CALCIUM CARB/MAGNESIUM 324 MG
1 TABLET ORAL
Qty: 0 | Refills: 0 | DISCHARGE

## 2021-01-11 RX ORDER — METFORMIN HYDROCHLORIDE 850 MG/1
1 TABLET ORAL
Qty: 0 | Refills: 0 | DISCHARGE

## 2021-01-11 RX ORDER — MULTIVIT-MIN/FERROUS GLUCONATE 9 MG/15 ML
1 LIQUID (ML) ORAL
Qty: 0 | Refills: 0 | DISCHARGE

## 2021-01-11 RX ORDER — CHOLECALCIFEROL (VITAMIN D3) 125 MCG
1 CAPSULE ORAL
Qty: 0 | Refills: 0 | DISCHARGE

## 2021-01-11 RX ORDER — FAMOTIDINE 10 MG/ML
0 INJECTION INTRAVENOUS
Qty: 0 | Refills: 0 | DISCHARGE

## 2021-01-11 RX ORDER — LATANOPROST 0.05 MG/ML
1 SOLUTION/ DROPS OPHTHALMIC; TOPICAL
Qty: 0 | Refills: 0 | DISCHARGE

## 2021-01-11 RX ORDER — DIVALPROEX SODIUM 500 MG/1
1 TABLET, DELAYED RELEASE ORAL
Qty: 0 | Refills: 0 | DISCHARGE

## 2021-01-11 RX ORDER — QUETIAPINE FUMARATE 200 MG/1
1 TABLET, FILM COATED ORAL
Qty: 0 | Refills: 0 | DISCHARGE

## 2021-01-11 RX ORDER — ATORVASTATIN CALCIUM 80 MG/1
1 TABLET, FILM COATED ORAL
Qty: 0 | Refills: 0 | DISCHARGE

## 2021-01-11 RX ORDER — METOPROLOL TARTRATE 50 MG
1 TABLET ORAL
Qty: 0 | Refills: 0 | DISCHARGE

## 2021-01-11 RX ORDER — ECHINACEA PURPUREA EXTRACT 125 MG
1 TABLET ORAL
Qty: 0 | Refills: 0 | DISCHARGE

## 2021-01-11 NOTE — ASU DISCHARGE PLAN (ADULT/PEDIATRIC) - CALL YOUR DOCTOR IF YOU HAVE ANY OF THE FOLLOWING:
Bleeding that does not stop/Swelling that gets worse/Pain not relieved by Medications Bleeding that does not stop/Swelling that gets worse/Pain not relieved by Medications/Fever greater than (need to indicate Fahrenheit or Celsius)/Wound/Surgical Site with redness, or foul smelling discharge or pus

## 2021-01-11 NOTE — BRIEF OPERATIVE NOTE - NSICDXBRIEFPREOP_GEN_ALL_CORE_FT
PRE-OP DIAGNOSIS:  Unspecified lump in the right breast, unspecified quadrant 11-Jan-2021 14:34:55  Fletcher Adam

## 2021-01-11 NOTE — ASU DISCHARGE PLAN (ADULT/PEDIATRIC) - DISCHARGE PLAN IS COMPLETE AND GIVEN TO PATIENT
Body Location Override (Optional - Billing Will Still Be Based On Selected Body Map Location If Applicable): left ear Wound Evaluated By: Reji PHELPS Add [id number] Cpt? (Important Note: In 2017 The Use Of 83026 Is Being Tracked By Cms To Determine Future Global Period Reimbursement For Global Periods): no Detail Level: Detailed Additional Comments: Electrocautery and drysol applied to wound.  Patient then remained in waiting area for 30 minutes area remained dry. Pressure dressing was applied and instructed to follow up sooner if complications : Yes

## 2021-01-11 NOTE — ASU DISCHARGE PLAN (ADULT/PEDIATRIC) - CARE PROVIDER_API CALL
Shashank Franco)  Surgery  [address] [place] [address]  Phone: [phone]  Fax: [phone]  Follow Up Time: 1 week

## 2021-01-11 NOTE — BRIEF OPERATIVE NOTE - NSICDXBRIEFPOSTOP_GEN_ALL_CORE_FT
POST-OP DIAGNOSIS:  Unspecified lump in the right breast, unspecified quadrant 11-Jan-2021 14:35:07  Fletcher Adam

## 2021-01-11 NOTE — ASU DISCHARGE PLAN (ADULT/PEDIATRIC) - FOLLOW UP APPOINTMENTS
[facility] ASU (Adult): [facility] ([facility]):

## 2021-01-11 NOTE — ASU DISCHARGE PLAN (ADULT/PEDIATRIC) - ASU DC SPECIAL INSTRUCTIONSFT
[facility]  CANCER  I  N  S  T  I  T  U  T E     Department of Surgery  Division of Surgical Oncology    Marlon Sánchez M.D., [email or web address].  Chief, Division of Surgical Oncology    Kenneth Briceño M.D., F.A.C.S., F.A.S.ROEN.  Associate , Department of Surgery    Hector Chen M.D., F.A.C.S.  Eugenio Serrano M.D., F.A.C.S.  Shashank Franco M.D., F.A.C.S.  Lio Ortiz M.D., F.A.C.S.  Eugenio Ospina M.D., F.A.C.S.  Alonso Rivera M.D., F.[email or web address].S.      Breast Biopsy/Lumpectomy Post-operative Instructions  1.	Supportive bra- Please bring a sports/athletic bra with you on the day of your surgery.  You will wear it home from the hospital.  You should wear the supportive bra continuously for 48 hours after the procedure, including wearing it to sleep.  Therefore, you may wear your regular bra.  You may remove the bra to shower.  The sports bra will provide support, decrease the amount of swelling at the biopsy site, and make your recovery more comfortable.    2.	Wound dressing- There is a dressing on the wound, which consists of 2 layers.  The outer layer is a clear plastic dressing (called Tegaderm) which is waterproof.  This should remain in place for 2 days post-operatively.  On the 2nd post-operative day, you should remove the clear plastic Tegaderm dressing.  Underneath the Tegaderm dressing, there are white surgical tapes (called steri strips) which are directly adherent to the skin.  These should remain on the skin, and will peel off naturally.  All of the stitches are “internal” and will dissolve naturally.    3.	Showering/Bathing- You may shower over the dressing the very next day after surgery.  Allow the water to run over the dressing, but don not scrub the area.  It is best not to sit in a bathtub or swimming pool for at least one week after surgery.    4.	Activity level- You may resume most normal daily activity as tolerated, but avoid strenuous activities such as aerobics, jogging, exercising or heavy lifting for at least 1 week after surgery.  You may return to work in 1-2 days after surgery.  You may drive as long as you are not taking any prescription pain medication.    5.	Pain Medication- You may take the prescribed medication, or you may take extra-strength Tylenol as needed.  Please don't take aspirin, Motrin, Advil or any other anti-inflammatory medications, as these medications may cause bleeding or bruising.    6.	Follow-up Appointment- Please call the office to schedule your post-operative appointment which should be approximately 10 days after your surgery. Office [phone]    7.	Bruising/Bleeding/Swelling- It is normal for there to be some bruising and swelling at the breast biopsy site, and there may be some staining of blood on to the dressing.  Some discomfort at the surgical site is expected.  If your symptoms seem excessive, or if you have any question or concerns, please call the office.      Anesthesia Precautions:  For the next 12 hours do not:   •	drive a car,  •	drink alcohol, beer, or wine,   •	make important personal or business decisions  Diet:   •	Progress diet slowly unless otherwise indicated    Physician Notification  •	Any Prescription issues  •	Bleeding that does not stop  •	Persistent nausea and vomiting  •	Pain not relieved by medications  •	Fever greater than 101®F  •	Inability to tolerate liquids or foods  •	Unable to urinate after 8 hours  Discharge and Disposition  •	Discharge to home/ group home/assisted living  •	Accompanied by Family/Spouse/ Parents/ Significant Other/ Friend/ and or Caregiver    Follow Up Care:  •	In the event that you develop a complication and you are unable to reach your own physician, you may contact:  [phone] or go to the nearest Emergency Room.   •	Please call your surgeon to schedule your follow up appointment [phone]

## 2021-01-14 LAB — SURGICAL PATHOLOGY STUDY: SIGNIFICANT CHANGE UP

## 2021-01-19 ENCOUNTER — APPOINTMENT (OUTPATIENT)
Dept: GYNECOLOGIC ONCOLOGY | Facility: CLINIC | Age: 71
End: 2021-01-19
Payer: MEDICARE

## 2021-01-19 VITALS
BODY MASS INDEX: 29.37 KG/M2 | DIASTOLIC BLOOD PRESSURE: 81 MMHG | SYSTOLIC BLOOD PRESSURE: 149 MMHG | HEIGHT: 64 IN | WEIGHT: 172 LBS | HEART RATE: 102 BPM

## 2021-01-19 PROCEDURE — 99024 POSTOP FOLLOW-UP VISIT: CPT

## 2021-01-20 ENCOUNTER — APPOINTMENT (OUTPATIENT)
Dept: SURGICAL ONCOLOGY | Facility: CLINIC | Age: 71
End: 2021-01-20
Payer: MEDICARE

## 2021-01-20 VITALS
DIASTOLIC BLOOD PRESSURE: 82 MMHG | TEMPERATURE: 98.1 F | RESPIRATION RATE: 14 BRPM | OXYGEN SATURATION: 95 % | SYSTOLIC BLOOD PRESSURE: 155 MMHG | HEIGHT: 64 IN | WEIGHT: 1 LBS | BODY MASS INDEX: 0.17 KG/M2 | HEART RATE: 94 BPM

## 2021-01-20 PROCEDURE — 10140 I&D HMTMA SEROMA/FLUID COLLJ: CPT | Mod: 79

## 2021-01-20 PROCEDURE — 99024 POSTOP FOLLOW-UP VISIT: CPT

## 2021-01-20 NOTE — HISTORY OF PRESENT ILLNESS
[de-identified] : Pt is a 71 y/o female who presents an initial post op visit. \par She is s/p ANTHONY  localized right lumpectomy for IDP and excision of right chest wall mass on 1/11/2021. \par Final pathology: \par 1) Right infraclavicular mass excision:  Breast tissue with scattered benign ducts and abundant adipose tissue\par 2) Right breast 12-1:00 lumpectomy:  Proliferative fibrocystic changes with foci of usual ductal epithelial hyperplasia, adenosis and stromal fibrosis.  Microcalcifications focally present in benign ducts and lobules.  Biopsy site changes. No papilloma present. \par \par \par Previous pertinent history:\par Pt. was referred by Dr. Bella Mckeon on 12/23/2020 for newly diagnosed right breast IDP.  \par \par Pathology (10/30/20): \par Breast, right, upper central, with ca++, stereotactic vacuum-\par assisted core biopsy\par - Columnar cell change with coarse calcifications\par - Proliferative fibrocystic changes with nodular sclerosing\par adenosis and\par microcalcifications\par - Non-proliferative fibrocystic change with dense stromal\par fibrosis and microcyst\par formation\par - Fibroadenomatoid change\par - Fat necrosis\par \par Bilateral US (10/23/20): Grouping of microcalcifications in the upper central right breast for which stereotactic guided biopsy is recommended. BI-RADS 4B.\par \par Pt reports she has DM.\par Denies palpable breast masses, nipple discharge, nipple retraction/inversion, or skin changes.\par

## 2021-01-20 NOTE — REASON FOR VISIT
[Post-Op] : a post-op for [FreeTextEntry2] : s/p ANTHONY  localized right lumpectomy for IDP and excision of right chest wall mass on 1/11/2021

## 2021-01-20 NOTE — PROCEDURE
[FreeTextEntry1] : Ultrasound-guided aspiration right chest postop hematoma performed under sterile conditions using 1% lidocaine with epinephrine\par 18-gauge needle used -6 cc old blood aspirated and discarded\par Residual collection remains\par Sterile dressing applied\par Patient tolerated procedure well

## 2021-01-20 NOTE — ASSESSMENT
[FreeTextEntry1] : S/p ANTHONY  localized right lumpectomy for IDP and excision of right chest wall mass on 1/11/2021\par Postop hematoma stable -reassured patient swelling will resolve\par RTO October after yearly breast imaging or as needed\par \par Enclosed pathology report\par

## 2021-01-20 NOTE — CONSULT LETTER
[Dear  ___] : Dear  [unfilled], [Courtesy Letter:] : I had the pleasure of seeing your patient, [unfilled], in my office today. [Please see my note below.] : Please see my note below. [Sincerely,] : Sincerely, [FreeTextEntry3] : Shashank Franco MD FACS [DrMarkus  ___] : Dr. CHINCHILLA

## 2021-01-22 RX ORDER — PROMETHAZINE HYDROCHLORIDE AND DEXTROMETHORPHAN HYDROBROMIDE ORAL SOLUTION 15; 6.25 MG/5ML; MG/5ML
6.25-15 SOLUTION ORAL
Qty: 240 | Refills: 0 | Status: DISCONTINUED | COMMUNITY
Start: 2020-03-05 | End: 2021-01-22

## 2021-01-22 RX ORDER — LATANOPROST/PF 0.005 %
0.01 DROPS OPHTHALMIC (EYE)
Refills: 0 | Status: ACTIVE | COMMUNITY
Start: 2020-07-07

## 2021-01-22 RX ORDER — DIVALPROEX SODIUM 500 MG/1
500 TABLET, DELAYED RELEASE ORAL
Refills: 0 | Status: ACTIVE | COMMUNITY

## 2021-01-29 NOTE — PHYSICAL EXAM
[General Appearance - Well Developed] : well developed [Normal Appearance] : normal appearance [Well Groomed] : well groomed [General Appearance - Well Nourished] : well nourished [No Deformities] : no deformities [General Appearance - In No Acute Distress] : no acute distress [Normal Oral Mucosa] : normal oral mucosa [No Oral Pallor] : no oral pallor [No Oral Cyanosis] : no oral cyanosis [Normal Jugular Venous A Waves Present] : normal jugular venous A waves present [Normal Jugular Venous V Waves Present] : normal jugular venous V waves present [No Jugular Venous Umaña A Waves] : no jugular venous umaña A waves [Respiration, Rhythm And Depth] : normal respiratory rhythm and effort [Exaggerated Use Of Accessory Muscles For Inspiration] : no accessory muscle use [Auscultation Breath Sounds / Voice Sounds] : lungs were clear to auscultation bilaterally [Heart Rate And Rhythm] : heart rate and rhythm were normal [Heart Sounds] : normal S1 and S2 [Murmurs] : no murmurs present [Abdomen Soft] : soft [Abdomen Tenderness] : non-tender [Abdomen Mass (___ Cm)] : no abdominal mass palpated [Abnormal Walk] : normal gait [Gait - Sufficient For Exercise Testing] : the gait was sufficient for exercise testing [Nail Clubbing] : no clubbing of the fingernails [Cyanosis, Localized] : no localized cyanosis [Petechial Hemorrhages (___cm)] : no petechial hemorrhages [Skin Color & Pigmentation] : normal skin color and pigmentation [] : no rash [No Venous Stasis] : no venous stasis [Skin Lesions] : no skin lesions [No Skin Ulcers] : no skin ulcer [No Xanthoma] : no  xanthoma was observed [Oriented To Time, Place, And Person] : oriented to person, place, and time [Affect] : the affect was normal [Mood] : the mood was normal [No Anxiety] : not feeling anxious

## 2021-02-02 ENCOUNTER — APPOINTMENT (OUTPATIENT)
Dept: CARDIOLOGY | Facility: CLINIC | Age: 71
End: 2021-02-02
Payer: MEDICARE

## 2021-03-09 ENCOUNTER — APPOINTMENT (OUTPATIENT)
Dept: CARDIOLOGY | Facility: CLINIC | Age: 71
End: 2021-03-09
Payer: MEDICARE

## 2021-03-09 VITALS
HEIGHT: 64 IN | WEIGHT: 152 LBS | OXYGEN SATURATION: 96 % | RESPIRATION RATE: 14 BRPM | BODY MASS INDEX: 25.95 KG/M2 | DIASTOLIC BLOOD PRESSURE: 81 MMHG | HEART RATE: 81 BPM | TEMPERATURE: 97 F | SYSTOLIC BLOOD PRESSURE: 145 MMHG

## 2021-03-09 PROCEDURE — 99214 OFFICE O/P EST MOD 30 MIN: CPT

## 2021-03-09 PROCEDURE — 99072 ADDL SUPL MATRL&STAF TM PHE: CPT

## 2021-03-09 PROCEDURE — 93000 ELECTROCARDIOGRAM COMPLETE: CPT

## 2021-03-09 RX ORDER — DIVALPROEX SODIUM 500 1/1
500 TABLET, EXTENDED RELEASE ORAL DAILY
Refills: 0 | Status: DISCONTINUED | COMMUNITY
Start: 2020-06-05 | End: 2021-03-09

## 2021-03-09 NOTE — PHYSICAL EXAM
[General Appearance - Well Developed] : well developed [Normal Appearance] : normal appearance [Well Groomed] : well groomed [General Appearance - Well Nourished] : well nourished [No Deformities] : no deformities [General Appearance - In No Acute Distress] : no acute distress [Normal Oral Mucosa] : normal oral mucosa [No Oral Pallor] : no oral pallor [No Oral Cyanosis] : no oral cyanosis [Normal Jugular Venous A Waves Present] : normal jugular venous A waves present [Normal Jugular Venous V Waves Present] : normal jugular venous V waves present [No Jugular Venous Umaña A Waves] : no jugular venous umaña A waves [Respiration, Rhythm And Depth] : normal respiratory rhythm and effort [Exaggerated Use Of Accessory Muscles For Inspiration] : no accessory muscle use [Auscultation Breath Sounds / Voice Sounds] : lungs were clear to auscultation bilaterally [Heart Rate And Rhythm] : heart rate and rhythm were normal [Heart Sounds] : normal S1 and S2 [Murmurs] : no murmurs present [Abdomen Soft] : soft [Abdomen Tenderness] : non-tender [Abdomen Mass (___ Cm)] : no abdominal mass palpated [Abnormal Walk] : normal gait [Gait - Sufficient For Exercise Testing] : the gait was sufficient for exercise testing [Nail Clubbing] : no clubbing of the fingernails [Cyanosis, Localized] : no localized cyanosis [Petechial Hemorrhages (___cm)] : no petechial hemorrhages [Skin Color & Pigmentation] : normal skin color and pigmentation [] : no rash [No Venous Stasis] : no venous stasis [Skin Lesions] : no skin lesions [No Skin Ulcers] : no skin ulcer [No Xanthoma] : no  xanthoma was observed [Oriented To Time, Place, And Person] : oriented to person, place, and time [Affect] : the affect was normal [Mood] : the mood was normal [No Anxiety] : not feeling anxious [Normal] : normal [Normal Rate] : normal [Rhythm Regular] : regular [Normal S1] : normal S1 [Normal S2] : normal S2 [2+] : left 2+

## 2021-03-09 NOTE — HISTORY OF PRESENT ILLNESS
[FreeTextEntry1] : 70 year old woman with history of DM (on Metformin), HLD (on atorvastatin) and anxiety (on depakote and seroquel) who is here for follow up. A Nuclear stress test performed by an outside cardiologist in 10/2020 showed normal LV function however small, mild inferior and inferoseptal ischemia. \par TTE done as outpatient 10/3/2020: that showed kaycee LV function\par \par EKG reviewed and normal\par \par SP both BERNY BSO and Lumpectomy--no events\par BP controlled

## 2021-03-09 NOTE — DISCUSSION/SUMMARY
[FreeTextEntry1] :  70 year old woman with history of DM (on Metformin), HLD (on atorvastatin) and anxiety (on depakote and seroquel) who is here for follow up. \par \par 1) HTN - acceptable range \par Continue Asa 81 mg QD \par Continue Metoprolol ER 25 mg QD\par EKG without ischemia\par \par 2) HLD - Continue Atorvastatin 20 mg QD .\par \par 3) DM - Continue Metformin 500mg QD \par \par Plans to see new PMD for physical and blood work

## 2021-03-12 ENCOUNTER — APPOINTMENT (OUTPATIENT)
Dept: SURGICAL ONCOLOGY | Facility: CLINIC | Age: 71
End: 2021-03-12
Payer: MEDICARE

## 2021-03-12 VITALS
OXYGEN SATURATION: 98 % | HEIGHT: 64 IN | BODY MASS INDEX: 26.29 KG/M2 | SYSTOLIC BLOOD PRESSURE: 155 MMHG | HEART RATE: 100 BPM | DIASTOLIC BLOOD PRESSURE: 79 MMHG | WEIGHT: 154 LBS

## 2021-03-12 PROCEDURE — 99024 POSTOP FOLLOW-UP VISIT: CPT

## 2021-03-13 NOTE — H&P PST ADULT - WEIGHT IN KG
80.7
Call bell/Explanation of exam/test/Fall precautions/Bedside visitors/I and O/Position of comfort/Side rails

## 2021-03-29 NOTE — ASSESSMENT
[FreeTextEntry1] : S/p right lumpectomy for an intraductal papilloma  and excision of right chest wall mass on 1/11/2021.\par Reassured pt that induration under right chest scar is a part of the normal healing process and will resolve.\par RTO October after yearly breast imaging or prn \par \par \par \par Enclosed pathology report

## 2021-03-29 NOTE — CONSULT LETTER
[Dear  ___] : Dear  [unfilled], [Courtesy Letter:] : I had the pleasure of seeing your patient, [unfilled], in my office today. [Please see my note below.] : Please see my note below. [Sincerely,] : Sincerely, [DrMarkus  ___] : Dr. CHINCHILLA [FreeTextEntry3] : Shashank Franco MD FACS

## 2021-03-29 NOTE — PHYSICAL EXAM
[de-identified] : right breast areolar lumpectomy and right chest incisions healing well. mild induration under right chest scar. us negative for seroma.

## 2021-03-29 NOTE — ADDENDUM
[FreeTextEntry1] : I, Kaleigh Dick, acted solely as a scribe for Dr. Shashank Franco on this date 03/12/2021.\par

## 2021-03-29 NOTE — HISTORY OF PRESENT ILLNESS
[de-identified] : Pt is a 71 y/o female who presents a post op visit. She is s/p ANTHONY  localized right lumpectomy for an intraductal papilloma and excision of right chest wall mass on 1/11/2021. Today she reports she took the first dose of the Covid-19 vaccine. \par \par Final pathology: \par 1) Right infraclavicular mass excision:  Breast tissue with scattered benign ducts and abundant adipose tissue\par 2) Right breast 12-1:00 lumpectomy:  Proliferative fibrocystic changes with foci of usual ductal epithelial hyperplasia, adenosis and stromal fibrosis.  Microcalcifications focally present in benign ducts and lobules.  Biopsy site changes. No papilloma present. \par \par \par Previous pertinent history:\par Pt. was referred by Dr. Bella Mckeon on 12/23/2020 for newly diagnosed right breast IDP.  \par \par Pathology (10/30/20): \par Breast, right, upper central, with ca++, stereotactic vacuum-\par assisted core biopsy\par - Columnar cell change with coarse calcifications\par - Proliferative fibrocystic changes with nodular sclerosing\par adenosis and\par microcalcifications\par - Non-proliferative fibrocystic change with dense stromal\par fibrosis and microcyst\par formation\par - Fibroadenomatoid change\par - Fat necrosis\par \par Bilateral US (10/23/20): Grouping of microcalcifications in the upper central right breast for which stereotactic guided biopsy is recommended. BI-RADS 4B.\par \par Pt reports she has DM.

## 2021-07-08 ENCOUNTER — FORM ENCOUNTER (OUTPATIENT)
Age: 71
End: 2021-07-08

## 2021-07-15 ENCOUNTER — NON-APPOINTMENT (OUTPATIENT)
Age: 71
End: 2021-07-15

## 2021-08-23 NOTE — ASU PREOP CHECKLIST - TYPE OF SOLUTION
Consent: The patient's consent was obtained including but not limited to risks of crusting, scabbing, blistering, scarring, darker or lighter pigmentary change, recurrence, incomplete removal and infection. Show Aperture Variable?: Yes Number Of Freeze-Thaw Cycles: 1 freeze-thaw cycle Detail Level: Detailed Duration Of Freeze Thaw-Cycle (Seconds): 0 Post-Care Instructions: I reviewed with the patient in detail post-care instructions. Patient is to wear sunprotection, and avoid picking at any of the treated lesions. Pt may apply Vaseline to crusted or scabbing areas. Render Post-Care Instructions In Note?: no LR @ 30cc/hr

## 2021-08-28 ENCOUNTER — APPOINTMENT (OUTPATIENT)
Dept: INTERNAL MEDICINE | Facility: CLINIC | Age: 71
End: 2021-08-28
Payer: MEDICARE

## 2021-08-28 VITALS
HEART RATE: 100 BPM | TEMPERATURE: 97.7 F | BODY MASS INDEX: 30.04 KG/M2 | OXYGEN SATURATION: 97 % | DIASTOLIC BLOOD PRESSURE: 62 MMHG | SYSTOLIC BLOOD PRESSURE: 128 MMHG | WEIGHT: 175 LBS

## 2021-08-28 DIAGNOSIS — Z82.49 FAMILY HISTORY OF ISCHEMIC HEART DISEASE AND OTHER DISEASES OF THE CIRCULATORY SYSTEM: ICD-10-CM

## 2021-08-28 DIAGNOSIS — F41.9 ANXIETY DISORDER, UNSPECIFIED: ICD-10-CM

## 2021-08-28 DIAGNOSIS — Z23 ENCOUNTER FOR IMMUNIZATION: ICD-10-CM

## 2021-08-28 DIAGNOSIS — Z80.8 FAMILY HISTORY OF MALIGNANT NEOPLASM OF OTHER ORGANS OR SYSTEMS: ICD-10-CM

## 2021-08-28 PROCEDURE — 90732 PPSV23 VACC 2 YRS+ SUBQ/IM: CPT

## 2021-08-28 PROCEDURE — 36415 COLL VENOUS BLD VENIPUNCTURE: CPT

## 2021-08-28 PROCEDURE — G0009: CPT

## 2021-08-28 PROCEDURE — G0008: CPT

## 2021-08-28 PROCEDURE — 90662 IIV NO PRSV INCREASED AG IM: CPT

## 2021-08-28 PROCEDURE — G0439: CPT

## 2021-08-28 NOTE — ASSESSMENT
[FreeTextEntry1] : 1) CPE\par \par - diet / exercise discussed \par - home safety and fall prevention discussed \par - check labs\par - needs colonoscopy\par - post hyst\par - due for BMD testing \par - follows w/ breast sx \par - pneumococcal vaccine today \par \par \par 2) HTN / HL/ DM\par - ct meds\par - check labs\par f/u in 3 mo

## 2021-08-28 NOTE — HEALTH RISK ASSESSMENT
[Good] : ~his/her~  mood as  good [] : No [Monthly or less (1 pt)] : Monthly or less (1 point) [Yes] : Yes [No falls in past year] : Patient reported no falls in the past year [0] : 2) Feeling down, depressed, or hopeless: Not at all (0) [de-identified] : aerobic 4 x / week  [de-identified] : regular  [Change in mental status noted] : No change in mental status noted [None] : None [Alone] : lives alone [Retired] : retired [] :  [Sexually Active] : not sexually active [Feels Safe at Home] : Feels safe at home [Fully functional (bathing, dressing, toileting, transferring, walking, feeding)] : Fully functional (bathing, dressing, toileting, transferring, walking, feeding) [Fully functional (using the telephone, shopping, preparing meals, housekeeping, doing laundry, using] : Fully functional and needs no help or supervision to perform IADLs (using the telephone, shopping, preparing meals, housekeeping, doing laundry, using transportation, managing medications and managing finances) [Reports changes in hearing] : Reports no changes in hearing [Reports changes in vision] : Reports no changes in vision [Reports changes in dental health] : Reports no changes in dental health [Smoke Detector] : smoke detector [Carbon Monoxide Detector] : carbon monoxide detector [Safety elements used in home] : safety elements used in home [Seat Belt] :  uses seat belt [MammogramDate] : 1/2021 [PapSmearDate] : tim camejo  [BoneDensityDate] : needed [ColonoscopyDate] : due [Name: ___] : Health Care Proxy's Name: [unfilled]  [Relationship: ___] : Relationship: [unfilled] [AdvancecareDate] : 8/28/21

## 2021-08-31 LAB
ALBUMIN SERPL ELPH-MCNC: 4.5 G/DL
ALP BLD-CCNC: 78 U/L
ALT SERPL-CCNC: 19 U/L
ANION GAP SERPL CALC-SCNC: 16 MMOL/L
AST SERPL-CCNC: 19 U/L
BASOPHILS # BLD AUTO: 0.07 K/UL
BASOPHILS NFR BLD AUTO: 1.1 %
BILIRUB SERPL-MCNC: 0.3 MG/DL
BUN SERPL-MCNC: 14 MG/DL
CALCIUM SERPL-MCNC: 9.7 MG/DL
CHLORIDE SERPL-SCNC: 102 MMOL/L
CHOLEST SERPL-MCNC: 214 MG/DL
CO2 SERPL-SCNC: 22 MMOL/L
CREAT SERPL-MCNC: 0.73 MG/DL
EOSINOPHIL # BLD AUTO: 0.26 K/UL
EOSINOPHIL NFR BLD AUTO: 4.3 %
ESTIMATED AVERAGE GLUCOSE: 166 MG/DL
GLUCOSE SERPL-MCNC: 248 MG/DL
HBA1C MFR BLD HPLC: 7.4 %
HCT VFR BLD CALC: 40.9 %
HDLC SERPL-MCNC: 50 MG/DL
HGB BLD-MCNC: 13.6 G/DL
IMM GRANULOCYTES NFR BLD AUTO: 0.3 %
LDLC SERPL CALC-MCNC: 110 MG/DL
LYMPHOCYTES # BLD AUTO: 1.95 K/UL
LYMPHOCYTES NFR BLD AUTO: 32 %
MAN DIFF?: NORMAL
MCHC RBC-ENTMCNC: 32.7 PG
MCHC RBC-ENTMCNC: 33.3 GM/DL
MCV RBC AUTO: 98.3 FL
MONOCYTES # BLD AUTO: 0.57 K/UL
MONOCYTES NFR BLD AUTO: 9.3 %
NEUTROPHILS # BLD AUTO: 3.23 K/UL
NEUTROPHILS NFR BLD AUTO: 53 %
NONHDLC SERPL-MCNC: 163 MG/DL
PLATELET # BLD AUTO: 219 K/UL
POTASSIUM SERPL-SCNC: 4.6 MMOL/L
PROT SERPL-MCNC: 7.4 G/DL
RBC # BLD: 4.16 M/UL
RBC # FLD: 11.8 %
SODIUM SERPL-SCNC: 139 MMOL/L
TRIGL SERPL-MCNC: 270 MG/DL
TSH SERPL-ACNC: 0.67 UIU/ML
WBC # FLD AUTO: 6.1 K/UL

## 2021-09-21 ENCOUNTER — APPOINTMENT (OUTPATIENT)
Dept: RADIOLOGY | Facility: CLINIC | Age: 71
End: 2021-09-21
Payer: MEDICARE

## 2021-09-21 PROCEDURE — 77080 DXA BONE DENSITY AXIAL: CPT

## 2021-10-08 ENCOUNTER — APPOINTMENT (OUTPATIENT)
Dept: CARDIOLOGY | Facility: CLINIC | Age: 71
End: 2021-10-08
Payer: MEDICARE

## 2021-10-08 ENCOUNTER — NON-APPOINTMENT (OUTPATIENT)
Age: 71
End: 2021-10-08

## 2021-10-08 VITALS
WEIGHT: 173 LBS | OXYGEN SATURATION: 97 % | HEIGHT: 64 IN | HEART RATE: 80 BPM | TEMPERATURE: 97.3 F | RESPIRATION RATE: 14 BRPM | BODY MASS INDEX: 29.53 KG/M2 | DIASTOLIC BLOOD PRESSURE: 81 MMHG | SYSTOLIC BLOOD PRESSURE: 143 MMHG

## 2021-10-08 PROCEDURE — 93000 ELECTROCARDIOGRAM COMPLETE: CPT

## 2021-10-08 PROCEDURE — 99214 OFFICE O/P EST MOD 30 MIN: CPT

## 2021-10-08 NOTE — HISTORY OF PRESENT ILLNESS
[FreeTextEntry1] : 70 year old woman with history of DM (on Metformin), HLD (on atorvastatin) and anxiety (on depakote and seroquel) who is here for follow up. A Nuclear stress test performed by an outside cardiologist in 10/2020 showed normal LV function however small, mild inferior and inferoseptal ischemia. \par TTE done as outpatient 10/3/2020: that showed kaycee LV function\par \par EKG reviewed and normal\par \par SP both BERNY BSO and Lumpectomy--no events\par BP controlled\par # HTN - acceptable range \par Continue Asa 81 mg QD \par Continue Metoprolol ER 25 mg QD\par EKG without ischemia\par # HLD - Continue Atorvastatin 20 mg QD .\par # DM - Continue Metformin 500mg QD

## 2021-10-08 NOTE — DISCUSSION/SUMMARY
[FreeTextEntry1] :  70 year old woman with history of DM (on Metformin), HLD (on atorvastatin) and anxiety (on depakote and seroquel) who is here for follow up. \par \par # HTN - acceptable range \par Continue Asa 81 mg QD \par Continue Metoprolol ER 25 mg QD\par EKG without ischemia\par # HLD - Continue Atorvastatin 20 mg QD .\par # DM - Continue Metformin 500mg QD \par

## 2021-10-19 ENCOUNTER — RESULT REVIEW (OUTPATIENT)
Age: 71
End: 2021-10-19

## 2021-11-06 ENCOUNTER — EMERGENCY (EMERGENCY)
Facility: HOSPITAL | Age: 71
LOS: 1 days | Discharge: HOME | End: 2021-11-06
Attending: EMERGENCY MEDICINE | Admitting: EMERGENCY MEDICINE
Payer: COMMERCIAL

## 2021-11-06 VITALS
RESPIRATION RATE: 16 BRPM | OXYGEN SATURATION: 100 % | DIASTOLIC BLOOD PRESSURE: 72 MMHG | SYSTOLIC BLOOD PRESSURE: 136 MMHG | HEART RATE: 88 BPM

## 2021-11-06 VITALS
TEMPERATURE: 98 F | OXYGEN SATURATION: 100 % | HEIGHT: 62.75 IN | DIASTOLIC BLOOD PRESSURE: 72 MMHG | SYSTOLIC BLOOD PRESSURE: 100 MMHG | RESPIRATION RATE: 16 BRPM | HEART RATE: 99 BPM

## 2021-11-06 DIAGNOSIS — Z90.710 ACQUIRED ABSENCE OF BOTH CERVIX AND UTERUS: Chronic | ICD-10-CM

## 2021-11-06 DIAGNOSIS — Z98.890 OTHER SPECIFIED POSTPROCEDURAL STATES: Chronic | ICD-10-CM

## 2021-11-06 PROCEDURE — 72100 X-RAY EXAM L-S SPINE 2/3 VWS: CPT | Mod: 26

## 2021-11-06 PROCEDURE — 99284 EMERGENCY DEPT VISIT MOD MDM: CPT

## 2021-11-06 PROCEDURE — 72020 X-RAY EXAM OF SPINE 1 VIEW: CPT | Mod: 26

## 2021-11-06 RX ORDER — DIAZEPAM 5 MG
5 TABLET ORAL ONCE
Refills: 0 | Status: DISCONTINUED | OUTPATIENT
Start: 2021-11-06 | End: 2021-11-06

## 2021-11-06 RX ORDER — KETOROLAC TROMETHAMINE 30 MG/ML
15 SYRINGE (ML) INJECTION ONCE
Refills: 0 | Status: DISCONTINUED | OUTPATIENT
Start: 2021-11-06 | End: 2021-11-06

## 2021-11-06 RX ORDER — LIDOCAINE 4 G/100G
1 CREAM TOPICAL ONCE
Refills: 0 | Status: COMPLETED | OUTPATIENT
Start: 2021-11-06 | End: 2021-11-06

## 2021-11-06 RX ADMIN — Medication 15 MILLIGRAM(S): at 15:56

## 2021-11-06 RX ADMIN — Medication 5 MILLIGRAM(S): at 13:14

## 2021-11-06 RX ADMIN — Medication 15 MILLIGRAM(S): at 13:14

## 2021-11-06 RX ADMIN — LIDOCAINE 1 PATCH: 4 CREAM TOPICAL at 13:14

## 2021-11-06 NOTE — ED PROVIDER NOTE - NSICDXPASTSURGICALHX_GEN_ALL_CORE_FT
PAST SURGICAL HISTORY:  H/O dilation and curettage     S/P hysterectomy Robotic Assisted BERNY- BSO ; 11/12/2020

## 2021-11-06 NOTE — ED PROVIDER NOTE - PATIENT PORTAL LINK FT
You can access the FollowMyHealth Patient Portal offered by [facility] by registering at the following website: [email or web address]. By joining [facility]’s FollowMyHealth portal, you will also be able to view your health information using other applications (apps) compatible with our system.

## 2021-11-06 NOTE — ED PROVIDER NOTE - NSICDXPASTMEDICALHX_GEN_ALL_CORE_FT
PAST MEDICAL HISTORY:  Anxiety     Depression with anxiety     Diabetes mellitus, type 2 fs  am 170 -190    Dyslipidemia     Eye pressure Elevated per pt    H/O benign neoplasm of breast     H/O ovarian cystectomy     Obesity     Uterine fibroid s/p Hysterectomy      Hyperlipidemia     Hypertension
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
## 2021-11-06 NOTE — ED PROVIDER NOTE - CONSTITUTIONAL, MLM
Well appearing, awake, alert, oriented to person, place, time/situation and in no apparent distress. Ambulatory. normal...
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
## 2021-11-06 NOTE — ED PROVIDER NOTE - PROGRESS NOTE DETAILS
xray neg, pt ambulatory in ed with no red flags. pt stable, hd stable. pt agres with plan, to d/c f/u with ortho if symptoms persist.
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
## 2021-11-23 ENCOUNTER — APPOINTMENT (OUTPATIENT)
Dept: GASTROENTEROLOGY | Facility: CLINIC | Age: 71
End: 2021-11-23
Payer: MEDICARE

## 2021-11-23 VITALS
HEIGHT: 64 IN | HEART RATE: 101 BPM | DIASTOLIC BLOOD PRESSURE: 83 MMHG | WEIGHT: 175 LBS | SYSTOLIC BLOOD PRESSURE: 145 MMHG | BODY MASS INDEX: 29.88 KG/M2

## 2021-11-23 DIAGNOSIS — Z12.11 ENCOUNTER FOR SCREENING FOR MALIGNANT NEOPLASM OF COLON: ICD-10-CM

## 2021-11-23 PROBLEM — E78.5 HYPERLIPIDEMIA, UNSPECIFIED: Chronic | Status: ACTIVE | Noted: 2021-11-06

## 2021-11-23 PROBLEM — I10 ESSENTIAL (PRIMARY) HYPERTENSION: Chronic | Status: ACTIVE | Noted: 2021-11-06

## 2021-11-23 PROCEDURE — 99204 OFFICE O/P NEW MOD 45 MIN: CPT

## 2021-11-23 RX ORDER — PREDNISOLONE ACETATE 10 MG/ML
1 SUSPENSION/ DROPS OPHTHALMIC
Qty: 5 | Refills: 0 | Status: ACTIVE | COMMUNITY
Start: 2021-11-15

## 2021-11-23 NOTE — HISTORY OF PRESENT ILLNESS
[Heartburn] : denies heartburn [Nausea] : denies nausea [Vomiting] : denies vomiting [Diarrhea] : denies diarrhea [Constipation] : stable constipation [Yellow Skin Or Eyes (Jaundice)] : denies jaundice [Abdominal Pain] : denies abdominal pain [Abdominal Swelling] : denies abdominal swelling [Rectal Pain] : denies rectal pain [de-identified] : Nayana presents to the office today for evaluation for colon cancer screening.  \par \par Over the past few years, the patient has felt that her BMs are sluggish and she feels constipated.  She moves her bowels every 2-3 days but sometimes she drinks a Senna-based tea in order to have a BM.  She otherwise feels well from a GI perspective, and denies any dysphagia, nausea, abdominal pain, GI bleeding, weight loss, or family history of colon cancer.  She had a prior  colonoscopy 5 years ago (records unavailable).  She underwent a BERNY-BSO last year.

## 2021-11-23 NOTE — ASSESSMENT
[FreeTextEntry1] : 1.  Encounter for colon cancer screening in average risk patient.  Previous colonoscopy 5 years ago.\par 2.  Constipation with bloating, may be due to medications or slow transit.\par 3.  HLD.\par 4.  HTN.\par 5.  Diabetes mellitus.\par 6.  Depression.\par 7.  Status post BERNY-BSO.\par \par Recs:\par - Recent labs reviewed.\par - The patient was advised to increase fluid and dietary fiber intake.  In addition, the patient was advised to use a fiber supplement daily.\par - Patient was advised to undergo colonoscopy- procedure, risks, benefits, and alternatives were explained. Patient agreeable. Brochure given. PEG prep.\par

## 2021-12-16 ENCOUNTER — APPOINTMENT (OUTPATIENT)
Dept: INTERNAL MEDICINE | Facility: CLINIC | Age: 71
End: 2021-12-16
Payer: MEDICARE

## 2021-12-16 VITALS
BODY MASS INDEX: 31.76 KG/M2 | SYSTOLIC BLOOD PRESSURE: 122 MMHG | WEIGHT: 185 LBS | OXYGEN SATURATION: 97 % | TEMPERATURE: 98.2 F | DIASTOLIC BLOOD PRESSURE: 70 MMHG | HEART RATE: 95 BPM

## 2021-12-16 PROCEDURE — 99214 OFFICE O/P EST MOD 30 MIN: CPT

## 2021-12-16 NOTE — PHYSICAL EXAM
[Normal Sclera/Conjunctiva] : normal sclera/conjunctiva [Normal Outer Ear/Nose] : the outer ears and nose were normal in appearance [No Edema] : there was no peripheral edema [Normal] : no posterior cervical lymphadenopathy and no anterior cervical lymphadenopathy

## 2021-12-16 NOTE — ASSESSMENT
[FreeTextEntry1] : 1) DM \par - ct meds \par - check A1C \par -optho- follow up adv \par \par 2) HTN \par - ct meds \par \par 3) HL\par - ct meds \par - check lipid \par \par 4) weight gain\par - check TSH / most likely 2/2 non compliance to diet

## 2021-12-16 NOTE — HISTORY OF PRESENT ILLNESS
[FreeTextEntry1] : f/u DM / HTN / HL \par \par \par taking meds \par no AE \par has been stress eating / has gained  weight \par \par BG - over 110 mostly \par \par no chest pain\par \par labs 8/28/21\par Cr 0.7 \par  \par A1C 7.4 \par \par BMD - nl \par \par mammo- follows w/ DR DEAN\par \par UTD flu shot \par \par reviewed crads and GI note \par colonoscopy scheduled

## 2021-12-17 LAB
CHOLEST SERPL-MCNC: 178 MG/DL
ESTIMATED AVERAGE GLUCOSE: 157 MG/DL
HBA1C MFR BLD HPLC: 7.1 %
HDLC SERPL-MCNC: 46 MG/DL
LDLC SERPL CALC-MCNC: 88 MG/DL
NONHDLC SERPL-MCNC: 132 MG/DL
TRIGL SERPL-MCNC: 219 MG/DL
TSH SERPL-ACNC: 0.51 UIU/ML

## 2021-12-28 ENCOUNTER — APPOINTMENT (OUTPATIENT)
Dept: GASTROENTEROLOGY | Facility: CLINIC | Age: 71
End: 2021-12-28
Payer: MEDICARE

## 2021-12-28 ENCOUNTER — LABORATORY RESULT (OUTPATIENT)
Age: 71
End: 2021-12-28

## 2021-12-28 PROCEDURE — 45380 COLONOSCOPY AND BIOPSY: CPT | Mod: PT

## 2022-01-12 ENCOUNTER — APPOINTMENT (OUTPATIENT)
Dept: SURGICAL ONCOLOGY | Facility: CLINIC | Age: 72
End: 2022-01-12

## 2022-02-09 ENCOUNTER — APPOINTMENT (OUTPATIENT)
Dept: SURGICAL ONCOLOGY | Facility: CLINIC | Age: 72
End: 2022-02-09

## 2022-03-02 ENCOUNTER — APPOINTMENT (OUTPATIENT)
Dept: SURGICAL ONCOLOGY | Facility: CLINIC | Age: 72
End: 2022-03-02

## 2022-03-14 ENCOUNTER — APPOINTMENT (OUTPATIENT)
Dept: MAMMOGRAPHY | Facility: IMAGING CENTER | Age: 72
End: 2022-03-14
Payer: MEDICARE

## 2022-03-14 ENCOUNTER — APPOINTMENT (OUTPATIENT)
Dept: ULTRASOUND IMAGING | Facility: IMAGING CENTER | Age: 72
End: 2022-03-14
Payer: MEDICARE

## 2022-03-14 ENCOUNTER — OUTPATIENT (OUTPATIENT)
Dept: OUTPATIENT SERVICES | Facility: HOSPITAL | Age: 72
LOS: 1 days | End: 2022-03-14
Payer: MEDICARE

## 2022-03-14 ENCOUNTER — RESULT REVIEW (OUTPATIENT)
Age: 72
End: 2022-03-14

## 2022-03-14 DIAGNOSIS — Z00.8 ENCOUNTER FOR OTHER GENERAL EXAMINATION: ICD-10-CM

## 2022-03-14 DIAGNOSIS — Z98.890 OTHER SPECIFIED POSTPROCEDURAL STATES: Chronic | ICD-10-CM

## 2022-03-14 DIAGNOSIS — Z90.710 ACQUIRED ABSENCE OF BOTH CERVIX AND UTERUS: Chronic | ICD-10-CM

## 2022-03-14 PROCEDURE — 76641 ULTRASOUND BREAST COMPLETE: CPT | Mod: 26,50

## 2022-03-14 PROCEDURE — G0279: CPT | Mod: 26

## 2022-03-14 PROCEDURE — 76641 ULTRASOUND BREAST COMPLETE: CPT

## 2022-03-14 PROCEDURE — 77066 DX MAMMO INCL CAD BI: CPT

## 2022-03-14 PROCEDURE — 77066 DX MAMMO INCL CAD BI: CPT | Mod: 26

## 2022-03-14 PROCEDURE — G0279: CPT

## 2022-03-16 ENCOUNTER — APPOINTMENT (OUTPATIENT)
Dept: SURGICAL ONCOLOGY | Facility: CLINIC | Age: 72
End: 2022-03-16
Payer: MEDICARE

## 2022-03-16 VITALS
TEMPERATURE: 96.9 F | WEIGHT: 174 LBS | DIASTOLIC BLOOD PRESSURE: 77 MMHG | OXYGEN SATURATION: 98 % | BODY MASS INDEX: 30.45 KG/M2 | HEIGHT: 63.5 IN | HEART RATE: 80 BPM | RESPIRATION RATE: 17 BRPM | SYSTOLIC BLOOD PRESSURE: 136 MMHG

## 2022-03-16 PROCEDURE — 99214 OFFICE O/P EST MOD 30 MIN: CPT | Mod: 25

## 2022-03-16 PROCEDURE — 11900 INJECT SKIN LESIONS </W 7: CPT

## 2022-03-16 NOTE — REASON FOR VISIT
[Follow-Up Visit] : a follow-up visit for [FreeTextEntry2] : s/p ANTHONY  localized right lumpectomy for IDP and excision of right chest wall mass on 1/11/2021

## 2022-03-16 NOTE — PROCEDURE
[FreeTextEntry1] : Right upper chest scar w/ mild keloid formation injected with half cc of Kenalog 10

## 2022-03-16 NOTE — HISTORY OF PRESENT ILLNESS
[de-identified] : Patient is a 72 y/o female who presents a follow up visit. She is s/p ANTHONY  localized right lumpectomy for an intraductal papilloma and excision of right chest wall mass on 1/11/2021. \par \par Recent bilateral mammo/sono performed on 3/14/22 showed no mammographic or sonographic evidence of malignancy. (BI-RADS 2)\par \par Final pathology: \par 1) Right infraclavicular mass excision:  Breast tissue with scattered benign ducts and abundant adipose tissue\par 2) Right breast 12-1:00 lumpectomy:  Proliferative fibrocystic changes with foci of usual ductal epithelial hyperplasia, adenosis and stromal fibrosis.  Microcalcifications focally present in benign ducts and lobules.  Biopsy site changes. No papilloma present. \par \par \par Previous pertinent history:\par Pt. was referred by Dr. Bella Mckeon on 12/23/2020 for newly diagnosed right breast IDP.  \par \par Pathology (10/30/20): \par Breast, right, upper central, with ca++, stereotactic vacuum-assisted core biopsy\par - Columnar cell change with coarse calcifications\par - Proliferative fibrocystic changes with nodular sclerosing adenosis and microcalcifications\par - Non-proliferative fibrocystic change with dense stromal fibrosis and microcyst formation\par - Fibroadenomatoid change\par - Fat necrosis\par \par Bilateral US (10/23/20): Grouping of microcalcifications in the upper central right breast for which stereotactic guided biopsy is recommended. BI-RADS 4B.\par \par Pt reports she has DM.\par She is fully Covid vaccinated.

## 2022-03-16 NOTE — ADDENDUM
[FreeTextEntry1] : I, Kaleigh Dick, acted solely as a scribe for Dr. Shashank Franco on this date 03/16/2022.\par \par \par \par

## 2022-03-16 NOTE — PHYSICAL EXAM
[Normal] : supple, no neck mass and thyroid not enlarged [Normal Neck Lymph Nodes] : normal neck lymph nodes  [Normal Supraclavicular Lymph Nodes] : normal supraclavicular lymph nodes [Normal Groin Lymph Nodes] : normal groin lymph nodes [Normal Axillary Lymph Nodes] : normal axillary lymph nodes [Normal] : oriented to person, place and time, with appropriate affect [de-identified] : right breast areolar lumpectomy scar well healed.  no masses or adenopathy bilaterally. right upper chest scar w/ mild keloid formation injected with Kenalog 10.

## 2022-03-16 NOTE — ASSESSMENT
[FreeTextEntry1] : S/p right lumpectomy for an intraductal papilloma and excision of right chest wall mass on 1/11/2021\par BI-RADS 2 imaging March 2022\par S/p Kenalog 10 injection right chest keloid\par Continue yearly breast imaging March 2023\par RTO 1 year

## 2022-03-17 ENCOUNTER — RX RENEWAL (OUTPATIENT)
Age: 72
End: 2022-03-17

## 2022-05-15 ENCOUNTER — RESULT CHARGE (OUTPATIENT)
Age: 72
End: 2022-05-15

## 2022-05-16 ENCOUNTER — RX RENEWAL (OUTPATIENT)
Age: 72
End: 2022-05-16

## 2022-05-16 ENCOUNTER — APPOINTMENT (OUTPATIENT)
Dept: CARDIOLOGY | Facility: CLINIC | Age: 72
End: 2022-05-16
Payer: MEDICARE

## 2022-05-16 ENCOUNTER — NON-APPOINTMENT (OUTPATIENT)
Age: 72
End: 2022-05-16

## 2022-05-16 VITALS
DIASTOLIC BLOOD PRESSURE: 80 MMHG | WEIGHT: 176 LBS | HEIGHT: 63 IN | SYSTOLIC BLOOD PRESSURE: 135 MMHG | HEART RATE: 88 BPM | OXYGEN SATURATION: 98 % | BODY MASS INDEX: 31.18 KG/M2 | TEMPERATURE: 97.5 F

## 2022-05-16 PROCEDURE — 99213 OFFICE O/P EST LOW 20 MIN: CPT

## 2022-05-16 PROCEDURE — 93000 ELECTROCARDIOGRAM COMPLETE: CPT

## 2022-05-16 NOTE — DISCUSSION/SUMMARY
[FreeTextEntry1] :  70 year old woman with history of DM (on Metformin), HLD (on atorvastatin) and anxiety (on depakote and seroquel) who is here for follow up. \par \par # HTN - acceptable range \par Continue Asa 81 mg QD \par Continue Metoprolol ER 25 mg QD\par EKG without ischemia\par # HLD - Continue Atorvastatin 20 mg QD .\par # DM - Continue Metformin 500mg QD \par #FU in 6 months\par

## 2022-06-02 ENCOUNTER — APPOINTMENT (OUTPATIENT)
Dept: INTERNAL MEDICINE | Facility: CLINIC | Age: 72
End: 2022-06-02
Payer: MEDICARE

## 2022-06-02 VITALS
OXYGEN SATURATION: 97 % | BODY MASS INDEX: 31.18 KG/M2 | WEIGHT: 176 LBS | SYSTOLIC BLOOD PRESSURE: 135 MMHG | TEMPERATURE: 98.6 F | HEART RATE: 92 BPM | HEIGHT: 63 IN | DIASTOLIC BLOOD PRESSURE: 78 MMHG

## 2022-06-02 DIAGNOSIS — Z01.818 ENCOUNTER FOR OTHER PREPROCEDURAL EXAMINATION: ICD-10-CM

## 2022-06-02 PROCEDURE — 99214 OFFICE O/P EST MOD 30 MIN: CPT

## 2022-06-02 RX ORDER — POLYETHYLENE GLYCOL 3350 AND ELECTROLYTES WITH LEMON FLAVOR 236; 22.74; 6.74; 5.86; 2.97 G/4L; G/4L; G/4L; G/4L; G/4L
236 POWDER, FOR SOLUTION ORAL
Qty: 1 | Refills: 0 | Status: DISCONTINUED | COMMUNITY
Start: 2021-11-23 | End: 2022-06-02

## 2022-06-02 NOTE — HISTORY OF PRESENT ILLNESS
[No Pertinent Cardiac History] : no history of aortic stenosis, atrial fibrillation, coronary artery disease, recent myocardial infarction, or implantable device/pacemaker [No Pertinent Pulmonary History] : no history of asthma, COPD, sleep apnea, or smoking [No Adverse Anesthesia Reaction] : no adverse anesthesia reaction in self or family member [Diabetes] : diabetes [(Patient denies any chest pain, claudication, dyspnea on exertion, orthopnea, palpitations or syncope)] : Patient denies any chest pain, claudication, dyspnea on exertion, orthopnea, palpitations or syncope [Moderate (4-6 METs)] : Moderate (4-6 METs) [Anti-Platelet Agents: _____] : Anti-Platelet Agents: [unfilled] [NSAIDs: _____] : NSAIDs: [unfilled] [Chronic Anticoagulation] : no chronic anticoagulation [Chronic Kidney Disease] : no chronic kidney disease [FreeTextEntry1] : L eye cataract surgery [FreeTextEntry2] : 6/6/22 [FreeTextEntry3] : Dr. Florin Curry, Fax # [phone] [FreeTextEntry4] : 72F PMH HTN, HLD, depression, T2DM who presents for pre-operative evaluation for L cataract surgery. \par \par Patient was seen by cardiology and had EKG ~2 weeks ago. \par EKG reviewed.\par \par Patient has been taking medications as prescribed, only uses meloxicam on occasion and prefers tylenol. \par \par Pre-op paperwork requires EKG within the past 12 months, no lab tests specifically required. [FreeTextEntry8] : 5 blocks

## 2022-06-02 NOTE — ASSESSMENT
[High Risk Surgery - Intraperitoneal, Intrathoracic or Supringuinal Vascular Procedures] : High Risk Surgery - Intraperitoneal, Intrathoracic or Supringuinal Vascular Procedures - No (0) [Ischemic Heart Disease] : Ischemic Heart Disease - No (0) [Congestive Heart Failure] : Congestive Heart Failure - No (0) [Prior Cerebrovascular Accident or TIA] : Prior Cerebrovascular Accident or TIA - No (0) [Creatinine >= 2mg/dL (1 Point)] : Creatinine >= 2mg/dL - No (0) [Insulin-dependent Diabetic (1 Point)] : Insulin-dependent Diabetic - No (0) [0] : 0 , RCRI Class: I, Risk of Post-Op Cardiac Complications: 3.9%, 95% CI for Risk Estimate: 2.8% - 5.4% [Patient Optimized for Surgery] : Patient optimized for surgery [No Further Testing Recommended] : no further testing recommended [FreeTextEntry4] : 72F PMH HTN, HLD, depression, T2DM who presents for pre-operative evaluation for L cataract surgery. \par \par Patient is RCRI class I and low-risk for a low-risk procedure. Recent EKG reviewed, QTc wnl. No symptoms of unstable cardiac or other medical condition. \par Patient advised to continue with her medication regimen as prescribed, including depakote, seroquel, atorvastatin, metoprolol, and baby aspirin.\par Although not commonly associated with quetiapine, floppy iris syndrome has been associated with some second generation antipsychotics. \par Patient was advised to hold just her morning dose of metformin on the day of the surgery.\par Patient advised to hold Meloxicam at least 4 days prior to procedure, in favor of Tylenol.\par Glaucoma medication as per ophthalmology.

## 2022-06-02 NOTE — PHYSICAL EXAM
[No Acute Distress] : no acute distress [Well-Appearing] : well-appearing [Normal Sclera/Conjunctiva] : normal sclera/conjunctiva [Normal Outer Ear/Nose] : the outer ears and nose were normal in appearance [No JVD] : no jugular venous distention [Supple] : supple [No Respiratory Distress] : no respiratory distress  [No Accessory Muscle Use] : no accessory muscle use [Clear to Auscultation] : lungs were clear to auscultation bilaterally [Normal Rate] : normal rate  [Regular Rhythm] : with a regular rhythm [Normal S1, S2] : normal S1 and S2 [No Murmur] : no murmur heard [No Edema] : there was no peripheral edema [Soft] : abdomen soft [Non Tender] : non-tender [Non-distended] : non-distended [No Rash] : no rash [Coordination Grossly Intact] : coordination grossly intact [Normal Gait] : normal gait [Speech Grossly Normal] : speech grossly normal [Normal Affect] : the affect was normal

## 2022-06-02 NOTE — REVIEW OF SYSTEMS
[Negative] : Heme/Lymph [Fever] : no fever [Chills] : no chills [Recent Change In Weight] : ~T no recent weight change [Discharge] : no discharge [Pain] : no pain [Chest Pain] : no chest pain [Palpitations] : no palpitations [Lower Ext Edema] : no lower extremity edema [Orthopnea] : no orthopnea [Paroxysmal Nocturnal Dyspnea] : no paroxysmal nocturnal dyspnea [Shortness Of Breath] : no shortness of breath [Wheezing] : no wheezing [Cough] : no cough [Dyspnea on Exertion] : no dyspnea on exertion

## 2022-06-04 ENCOUNTER — NON-APPOINTMENT (OUTPATIENT)
Age: 72
End: 2022-06-04

## 2022-09-09 ENCOUNTER — RX RENEWAL (OUTPATIENT)
Age: 72
End: 2022-09-09

## 2022-09-16 ENCOUNTER — RX RENEWAL (OUTPATIENT)
Age: 72
End: 2022-09-16

## 2022-11-01 ENCOUNTER — APPOINTMENT (OUTPATIENT)
Dept: INTERNAL MEDICINE | Facility: CLINIC | Age: 72
End: 2022-11-01

## 2022-11-01 VITALS
WEIGHT: 176 LBS | HEART RATE: 83 BPM | SYSTOLIC BLOOD PRESSURE: 136 MMHG | DIASTOLIC BLOOD PRESSURE: 79 MMHG | TEMPERATURE: 98.1 F | HEIGHT: 63 IN | OXYGEN SATURATION: 98 % | BODY MASS INDEX: 31.18 KG/M2

## 2022-11-01 PROCEDURE — 90471 IMMUNIZATION ADMIN: CPT

## 2022-11-01 PROCEDURE — G0008: CPT | Mod: 59

## 2022-11-01 PROCEDURE — G0439: CPT

## 2022-11-01 PROCEDURE — 90677 PCV20 VACCINE IM: CPT

## 2022-11-01 PROCEDURE — G0009: CPT | Mod: 59

## 2022-11-01 PROCEDURE — 90662 IIV NO PRSV INCREASED AG IM: CPT

## 2022-11-01 RX ORDER — OXYCODONE AND ACETAMINOPHEN 5; 325 MG/1; MG/1
5-325 TABLET ORAL
Qty: 40 | Refills: 0 | Status: DISCONTINUED | COMMUNITY
Start: 2022-08-04

## 2022-11-01 RX ORDER — CELECOXIB 200 MG/1
200 CAPSULE ORAL
Qty: 60 | Refills: 0 | Status: DISCONTINUED | COMMUNITY
Start: 2022-06-20

## 2022-11-01 RX ORDER — ONDANSETRON 8 MG/1
8 TABLET ORAL
Qty: 30 | Refills: 0 | Status: DISCONTINUED | COMMUNITY
Start: 2022-08-04

## 2022-11-01 NOTE — PHYSICAL EXAM
[Normal Sclera/Conjunctiva] : normal sclera/conjunctiva [Normal Outer Ear/Nose] : the outer ears and nose were normal in appearance [No Edema] : there was no peripheral edema [Normal Appearance] : normal in appearance [No Masses] : no palpable masses [No Nipple Discharge] : no nipple discharge [No Axillary Lymphadenopathy] : no axillary lymphadenopathy [Normal] : normal gait, coordination grossly intact, no focal deficits and deep tendon reflexes were 2+ and symmetric [de-identified] : pigmented lesion R breast

## 2022-11-01 NOTE — ASSESSMENT
[FreeTextEntry1] : 1) WELLNESS CHECK\par \par - diet / exercise discussed \par - home safety  and fall prevention discussed\par - check labs \par - mammo- UTD \par - BMD / colonoscopy - UTD \par - has had pneumovax / Covid vaccination + booster x 2 \par - need flu shot , Tdap , Prevnar 20 \par - optho \par \par 2) HTN \par - on BB from cards bc of small area of ischemia on previous stress test \par - add losartan \par \par 3) HL / DM \par - ct meds \par \par \par 4) Derm eval for the pigmented breast lesion

## 2022-11-01 NOTE — HISTORY OF PRESENT ILLNESS
[FreeTextEntry1] : wellness check \par no active new complaints\par \par pt arrived by access- a - ride \par

## 2022-11-01 NOTE — HEALTH RISK ASSESSMENT
[Good] : ~his/her~  mood as  good [Never] : Never [Yes] : Yes [Monthly or less (1 pt)] : Monthly or less (1 point) [No falls in past year] : Patient reported no falls in the past year [0] : 2) Feeling down, depressed, or hopeless: Not at all (0) [de-identified] : walks  [de-identified] : diabetic [Patient reported mammogram was normal] : Patient reported mammogram was normal [Patient reported bone density results were normal] : Patient reported bone density results were normal [Change in mental status noted] : No change in mental status noted [None] : None [Alone] : lives alone [Retired] : retired [] :  [Sexually Active] : sexually active [Feels Safe at Home] : Feels safe at home [Fully functional (bathing, dressing, toileting, transferring, walking, feeding)] : Fully functional (bathing, dressing, toileting, transferring, walking, feeding) [Reports changes in hearing] : Reports no changes in hearing [Reports changes in vision] : Reports changes in vision [Reports changes in dental health] : Reports no changes in dental health [Smoke Detector] : smoke detector [Carbon Monoxide Detector] : carbon monoxide detector [Safety elements used in home] : safety elements used in home [Seat Belt] :  uses seat belt [MammogramDate] : 2022 [BoneDensityDate] : 2021 [ColonoscopyDate] : 2021 [ColonoscopyComments] : poor prep- rpt in 3 yrs  [de-identified] : needs help w/ shopping / cooking / transport [de-identified] : following w/ optho  [Designated Healthcare Proxy] : Designated healthcare proxy [Name: ___] : Health Care Proxy's Name: [unfilled]  [Relationship: ___] : Relationship: [unfilled] [AdvancecareDate] : 11/1/22

## 2022-11-02 LAB
ALBUMIN SERPL ELPH-MCNC: 4.5 G/DL
ALP BLD-CCNC: 87 U/L
ALT SERPL-CCNC: 28 U/L
ANION GAP SERPL CALC-SCNC: 13 MMOL/L
AST SERPL-CCNC: 21 U/L
BASOPHILS # BLD AUTO: 0.07 K/UL
BASOPHILS NFR BLD AUTO: 1.1 %
BILIRUB SERPL-MCNC: 0.2 MG/DL
BUN SERPL-MCNC: 14 MG/DL
CALCIUM SERPL-MCNC: 10.3 MG/DL
CHLORIDE SERPL-SCNC: 98 MMOL/L
CHOLEST SERPL-MCNC: 207 MG/DL
CO2 SERPL-SCNC: 26 MMOL/L
CREAT SERPL-MCNC: 0.67 MG/DL
EGFR: 93 ML/MIN/1.73M2
EOSINOPHIL # BLD AUTO: 0.37 K/UL
EOSINOPHIL NFR BLD AUTO: 5.7 %
ESTIMATED AVERAGE GLUCOSE: 186 MG/DL
GLUCOSE SERPL-MCNC: 156 MG/DL
HBA1C MFR BLD HPLC: 8.1 %
HCT VFR BLD CALC: 42.3 %
HDLC SERPL-MCNC: 48 MG/DL
HGB BLD-MCNC: 13.7 G/DL
IMM GRANULOCYTES NFR BLD AUTO: 0.2 %
LDLC SERPL CALC-MCNC: 106 MG/DL
LYMPHOCYTES # BLD AUTO: 2.44 K/UL
LYMPHOCYTES NFR BLD AUTO: 37.5 %
MAN DIFF?: NORMAL
MCHC RBC-ENTMCNC: 31.6 PG
MCHC RBC-ENTMCNC: 32.4 GM/DL
MCV RBC AUTO: 97.7 FL
MONOCYTES # BLD AUTO: 0.74 K/UL
MONOCYTES NFR BLD AUTO: 11.4 %
NEUTROPHILS # BLD AUTO: 2.88 K/UL
NEUTROPHILS NFR BLD AUTO: 44.1 %
NONHDLC SERPL-MCNC: 160 MG/DL
PLATELET # BLD AUTO: 260 K/UL
POTASSIUM SERPL-SCNC: 4.6 MMOL/L
PROT SERPL-MCNC: 7.8 G/DL
RBC # BLD: 4.33 M/UL
RBC # FLD: 12.1 %
SODIUM SERPL-SCNC: 138 MMOL/L
TRIGL SERPL-MCNC: 269 MG/DL
TSH SERPL-ACNC: 0.51 UIU/ML
WBC # FLD AUTO: 6.51 K/UL

## 2022-11-02 RX ORDER — METFORMIN HYDROCHLORIDE 1000 MG/1
1000 TABLET, COATED ORAL
Qty: 180 | Refills: 0 | Status: DISCONTINUED | COMMUNITY
Start: 2022-09-09 | End: 2022-11-02

## 2022-11-14 ENCOUNTER — NON-APPOINTMENT (OUTPATIENT)
Age: 72
End: 2022-11-14

## 2022-11-14 ENCOUNTER — APPOINTMENT (OUTPATIENT)
Dept: CARDIOLOGY | Facility: CLINIC | Age: 72
End: 2022-11-14

## 2022-11-14 VITALS
DIASTOLIC BLOOD PRESSURE: 81 MMHG | SYSTOLIC BLOOD PRESSURE: 146 MMHG | HEART RATE: 98 BPM | OXYGEN SATURATION: 99 % | TEMPERATURE: 98.1 F | WEIGHT: 174 LBS | HEIGHT: 63 IN | BODY MASS INDEX: 30.83 KG/M2

## 2022-11-14 PROCEDURE — 93000 ELECTROCARDIOGRAM COMPLETE: CPT

## 2022-11-14 PROCEDURE — 99214 OFFICE O/P EST MOD 30 MIN: CPT | Mod: 25

## 2022-11-14 NOTE — DISCUSSION/SUMMARY
[EKG obtained to assist in diagnosis and management of assessed problem(s)] : EKG obtained to assist in diagnosis and management of assessed problem(s) [FreeTextEntry1] : 72 year old woman with history of DM (on Metformin), HLD (on atorvastatin) and anxiety (on depakote and seroquel) who is here for follow up. \par \par # HTN - acceptable range \par Continue Asa 81 mg QD \par Continue Metoprolol ER 25 mg QD\par EKG without ischemia\par # HLD - Continue Atorvastatin 20 mg QD .\par # DM - Continue Metformin 500mg QD \par #FU in 6 months\par

## 2022-11-14 NOTE — HISTORY OF PRESENT ILLNESS
[FreeTextEntry1] : 72 year old woman with history of DM (on Metformin), HLD (on atorvastatin) and anxiety (on depakote and seroquel) who is here for follow up. A Nuclear stress test performed by an outside cardiologist in 10/2020 showed normal LV function however small, mild inferior and inferoseptal ischemia. \par TTE done as outpatient 10/3/2020: that showed normal LV function\par \par EKG reviewed and normal\par \par SP both BERNY BSO and Lumpectomy--no events\par BP controlled\par # HTN - acceptable range \par Continue Asa 81 mg QD \par Continue Metoprolol ER 25 mg QD\par EKG without ischemia\par # HLD - Continue Atorvastatin 20 mg QD .\par # DM - Continue Metformin 500mg QD

## 2022-12-12 NOTE — H&P PST ADULT - TEMPERATURE IN FAHRENHEIT (DEGREES F)
Azithromycin Pregnancy And Lactation Text: This medication is considered safe during pregnancy and is also secreted in breast milk. 98.4

## 2023-01-09 ENCOUNTER — NON-APPOINTMENT (OUTPATIENT)
Age: 73
End: 2023-01-09

## 2023-01-10 RX ORDER — SITAGLIPTIN AND METFORMIN HYDROCHLORIDE 50; 1000 MG/1; MG/1
50-1000 TABLET, FILM COATED ORAL
Qty: 7 | Refills: 0 | Status: DISCONTINUED | COMMUNITY
Start: 2023-01-09 | End: 2023-01-10

## 2023-01-10 RX ORDER — SITAGLIPTIN AND METFORMIN HYDROCHLORIDE 50; 1000 MG/1; MG/1
50-1000 TABLET, FILM COATED ORAL
Qty: 180 | Refills: 0 | Status: DISCONTINUED | COMMUNITY
Start: 2022-11-02 | End: 2023-01-10

## 2023-01-27 ENCOUNTER — RX RENEWAL (OUTPATIENT)
Age: 73
End: 2023-01-27

## 2023-02-07 ENCOUNTER — APPOINTMENT (OUTPATIENT)
Dept: INTERNAL MEDICINE | Facility: CLINIC | Age: 73
End: 2023-02-07
Payer: MEDICARE

## 2023-02-07 VITALS
OXYGEN SATURATION: 96 % | DIASTOLIC BLOOD PRESSURE: 68 MMHG | TEMPERATURE: 97.6 F | HEIGHT: 63 IN | BODY MASS INDEX: 28.7 KG/M2 | WEIGHT: 162 LBS | SYSTOLIC BLOOD PRESSURE: 120 MMHG | HEART RATE: 76 BPM

## 2023-02-07 DIAGNOSIS — Z01.818 ENCOUNTER FOR OTHER PREPROCEDURAL EXAMINATION: ICD-10-CM

## 2023-02-07 PROCEDURE — 99213 OFFICE O/P EST LOW 20 MIN: CPT

## 2023-02-07 RX ORDER — MELOXICAM 7.5 MG/1
7.5 TABLET ORAL
Qty: 15 | Refills: 0 | Status: DISCONTINUED | COMMUNITY
Start: 2021-11-11 | End: 2023-02-07

## 2023-02-07 RX ORDER — LINAGLIPTIN 5 MG/1
5 TABLET, FILM COATED ORAL
Qty: 90 | Refills: 0 | Status: DISCONTINUED | COMMUNITY
Start: 2023-01-10 | End: 2023-02-07

## 2023-02-07 NOTE — REVIEW OF SYSTEMS
[Constipation] : constipation [Fever] : no fever [Chills] : no chills [Fatigue] : no fatigue [Night Sweats] : no night sweats [Discharge] : no discharge [Pain] : no pain [Redness] : no redness [Vision Problems] : no vision problems [Chest Pain] : no chest pain [Palpitations] : no palpitations [Lower Ext Edema] : no lower extremity edema [Orthopnea] : no orthopnea [Wheezing] : no wheezing [Cough] : no cough [Dyspnea on Exertion] : no dyspnea on exertion [Abdominal Pain] : no abdominal pain [Nausea] : no nausea [Diarrhea] : diarrhea [Vomiting] : no vomiting [Headache] : no headache [Dizziness] : no dizziness [Fainting] : no fainting [Easy Bleeding] : no easy bleeding [Easy Bruising] : no easy bruising [Swollen Glands] : no swollen glands [FreeTextEntry9] : right shoulder pain (engaged in PT)

## 2023-02-07 NOTE — PHYSICAL EXAM
[No Acute Distress] : no acute distress [Well-Appearing] : well-appearing [Normal Voice/Communication] : normal voice/communication [No JVD] : no jugular venous distention [No Lymphadenopathy] : no lymphadenopathy [Supple] : supple [No Respiratory Distress] : no respiratory distress  [No Accessory Muscle Use] : no accessory muscle use [Clear to Auscultation] : lungs were clear to auscultation bilaterally [Normal Rate] : normal rate  [Regular Rhythm] : with a regular rhythm [Normal S1, S2] : normal S1 and S2 [No Murmur] : no murmur heard [No Carotid Bruits] : no carotid bruits [Pedal Pulses Present] : the pedal pulses are present [No Edema] : there was no peripheral edema [Soft] : abdomen soft [Non Tender] : non-tender [Normal Supraclavicular Nodes] : no supraclavicular lymphadenopathy [Normal Posterior Cervical Nodes] : no posterior cervical lymphadenopathy [Normal Anterior Cervical Nodes] : no anterior cervical lymphadenopathy [Coordination Grossly Intact] : coordination grossly intact [No Focal Deficits] : no focal deficits [Normal Gait] : normal gait [Normal Affect] : the affect was normal [Normal Insight/Judgement] : insight and judgment were intact
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
## 2023-02-07 NOTE — RESULTS/DATA
[] : results reviewed [No Acute Ischemia] : No acute ischemia [NSR] : normal sinus rhythm [No Interval Change] : no interval change [de-identified] : done 11/1/2022 [de-identified] : done 11/1/2022 [de-identified] : A1C 7.5 today.

## 2023-02-07 NOTE — PLAN
[FreeTextEntry1] : Patient unable to drive d/t ongoing shoulder pain and impaired ROM.  Continues PT.  Letter provided for continued [employer] services.  \par

## 2023-02-09 LAB — HBA1C MFR BLD HPLC: 7.5

## 2023-02-28 ENCOUNTER — RX RENEWAL (OUTPATIENT)
Age: 73
End: 2023-02-28

## 2023-03-10 NOTE — ADDENDUM
[FreeTextEntry1] : I, Kaleigh Dick, acted solely as a scribe for Dr. Shashank Franco on this date 03/10/2023.\par \par \par \par \par

## 2023-03-10 NOTE — PHYSICAL EXAM
[Normal] : supple, no neck mass and thyroid not enlarged [Normal Neck Lymph Nodes] : normal neck lymph nodes  [Normal Supraclavicular Lymph Nodes] : normal supraclavicular lymph nodes [Normal Groin Lymph Nodes] : normal groin lymph nodes [Normal Axillary Lymph Nodes] : normal axillary lymph nodes [Normal] : oriented to person, place and time, with appropriate affect [de-identified] : right breast areolar lumpectomy scar well healed.  no masses or adenopathy bilaterally. right upper chest scar w/ mild keloid formation injected with Kenalog 10.

## 2023-03-10 NOTE — HISTORY OF PRESENT ILLNESS
[de-identified] : Patient is a 73 y/o female who presents a follow up visit. She is s/p ANTHONY  localized right lumpectomy for an intraductal papilloma and excision of right chest wall mass on 1/11/2021. \par \par Bilateral mammo/sono performed on 3/14/22 showed no mammographic or sonographic evidence of malignancy. (BI-RADS 2)\par \par Final pathology: \par 1) Right infraclavicular mass excision:  Breast tissue with scattered benign ducts and abundant adipose tissue\par 2) Right breast 12-1:00 lumpectomy:  Proliferative fibrocystic changes with foci of usual ductal epithelial hyperplasia, adenosis and stromal fibrosis.  Microcalcifications focally present in benign ducts and lobules.  Biopsy site changes. No papilloma present. \par \par \par Previous pertinent history:\par Pt. was referred by Dr. Bella Mckeon on 12/23/2020 for newly diagnosed right breast IDP.  \par \par Pathology (10/30/20): \par Breast, right, upper central, with ca++, stereotactic vacuum-assisted core biopsy\par - Columnar cell change with coarse calcifications\par - Proliferative fibrocystic changes with nodular sclerosing adenosis and microcalcifications\par - Non-proliferative fibrocystic change with dense stromal fibrosis and microcyst formation\par - Fibroadenomatoid change\par - Fat necrosis\par \par Bilateral US (10/23/20): Grouping of microcalcifications in the upper central right breast for which stereotactic guided biopsy is recommended. BI-RADS 4B.\par \par Pt reports she has DM.\par She is fully Covid vaccinated.

## 2023-03-14 ENCOUNTER — RESULT REVIEW (OUTPATIENT)
Age: 73
End: 2023-03-14

## 2023-03-15 ENCOUNTER — APPOINTMENT (OUTPATIENT)
Dept: SURGICAL ONCOLOGY | Facility: CLINIC | Age: 73
End: 2023-03-15

## 2023-03-25 ENCOUNTER — RX RENEWAL (OUTPATIENT)
Age: 73
End: 2023-03-25

## 2023-03-31 ENCOUNTER — APPOINTMENT (OUTPATIENT)
Dept: ULTRASOUND IMAGING | Facility: IMAGING CENTER | Age: 73
End: 2023-03-31
Payer: MEDICARE

## 2023-03-31 ENCOUNTER — OUTPATIENT (OUTPATIENT)
Dept: OUTPATIENT SERVICES | Facility: HOSPITAL | Age: 73
LOS: 1 days | End: 2023-03-31
Payer: MEDICARE

## 2023-03-31 ENCOUNTER — RESULT REVIEW (OUTPATIENT)
Age: 73
End: 2023-03-31

## 2023-03-31 ENCOUNTER — APPOINTMENT (OUTPATIENT)
Dept: MAMMOGRAPHY | Facility: IMAGING CENTER | Age: 73
End: 2023-03-31
Payer: MEDICARE

## 2023-03-31 DIAGNOSIS — Z00.8 ENCOUNTER FOR OTHER GENERAL EXAMINATION: ICD-10-CM

## 2023-03-31 DIAGNOSIS — Z98.890 OTHER SPECIFIED POSTPROCEDURAL STATES: Chronic | ICD-10-CM

## 2023-03-31 DIAGNOSIS — Z90.710 ACQUIRED ABSENCE OF BOTH CERVIX AND UTERUS: Chronic | ICD-10-CM

## 2023-03-31 PROCEDURE — 77063 BREAST TOMOSYNTHESIS BI: CPT | Mod: 26

## 2023-03-31 PROCEDURE — 76641 ULTRASOUND BREAST COMPLETE: CPT | Mod: 26,50

## 2023-03-31 PROCEDURE — 77067 SCR MAMMO BI INCL CAD: CPT | Mod: 26

## 2023-03-31 PROCEDURE — 77063 BREAST TOMOSYNTHESIS BI: CPT

## 2023-03-31 PROCEDURE — 77067 SCR MAMMO BI INCL CAD: CPT

## 2023-03-31 PROCEDURE — 76641 ULTRASOUND BREAST COMPLETE: CPT

## 2023-04-03 NOTE — ADDENDUM
[FreeTextEntry1] : I, Kaleigh Dick, acted solely as a scribe for Dr. Shashank Franco on this date 04/05/2023.\par \par \par \par \par \par

## 2023-04-03 NOTE — PHYSICAL EXAM
[Normal] : supple, no neck mass and thyroid not enlarged [Normal Neck Lymph Nodes] : normal neck lymph nodes  [Normal Supraclavicular Lymph Nodes] : normal supraclavicular lymph nodes [Normal Groin Lymph Nodes] : normal groin lymph nodes [Normal Axillary Lymph Nodes] : normal axillary lymph nodes [Normal] : oriented to person, place and time, with appropriate affect [de-identified] : right breast areolar lumpectomy scar well healed.  no masses or adenopathy bilaterally. right upper chest scar w/ mild keloid formation injected with Kenalog 10.

## 2023-04-03 NOTE — HISTORY OF PRESENT ILLNESS
[de-identified] : Patient is a 71 y/o female who presents a follow up visit. She is s/p ANTHONY  localized right lumpectomy for an intraductal papilloma and excision of right chest wall mass on 1/11/2021. \par \par Bilateral mammo/sono performed on 3/31/23 showed no mammographic or sonographic evidence of malignancy. (BI-RADS 2)\par \par Bilateral mammo/sono performed on 3/14/22 showed no mammographic or sonographic evidence of malignancy. (BI-RADS 2)\par \par Final pathology: \par 1) Right infraclavicular mass excision:  Breast tissue with scattered benign ducts and abundant adipose tissue\par 2) Right breast 12-1:00 lumpectomy:  Proliferative fibrocystic changes with foci of usual ductal epithelial hyperplasia, adenosis and stromal fibrosis.  Microcalcifications focally present in benign ducts and lobules.  Biopsy site changes. No papilloma present. \par \par \par Previous pertinent history:\par Pt. was referred by Dr. Bella Mckeon on 12/23/2020 for newly diagnosed right breast IDP.  \par \par Pathology (10/30/20): \par Breast, right, upper central, with ca++, stereotactic vacuum-assisted core biopsy\par - Columnar cell change with coarse calcifications\par - Proliferative fibrocystic changes with nodular sclerosing adenosis and microcalcifications\par - Non-proliferative fibrocystic change with dense stromal fibrosis and microcyst formation\par - Fibroadenomatoid change\par - Fat necrosis\par \par Bilateral US (10/23/20): Grouping of microcalcifications in the upper central right breast for which stereotactic guided biopsy is recommended. BI-RADS 4B.\par \par Pt reports she has DM.\par She is fully Covid vaccinated.

## 2023-04-03 NOTE — ASSESSMENT
[FreeTextEntry1] : S/p right lumpectomy for an intraductal papilloma and excision of right chest wall mass on 1/11/2021\par BI-RADS 2 imaging March 2022\par S/p Kenalog 10 injection right chest keloid\par Continue yearly breast imaging March 2024 (ordered)\par RTO 1 year

## 2023-04-05 ENCOUNTER — APPOINTMENT (OUTPATIENT)
Dept: SURGICAL ONCOLOGY | Facility: CLINIC | Age: 73
End: 2023-04-05
Payer: MEDICARE

## 2023-04-15 ENCOUNTER — TRANSCRIPTION ENCOUNTER (OUTPATIENT)
Age: 73
End: 2023-04-15

## 2023-05-02 ENCOUNTER — APPOINTMENT (OUTPATIENT)
Dept: INTERNAL MEDICINE | Facility: CLINIC | Age: 73
End: 2023-05-02
Payer: MEDICARE

## 2023-05-02 ENCOUNTER — NON-APPOINTMENT (OUTPATIENT)
Age: 73
End: 2023-05-02

## 2023-05-02 VITALS
SYSTOLIC BLOOD PRESSURE: 129 MMHG | OXYGEN SATURATION: 97 % | HEART RATE: 76 BPM | HEIGHT: 63 IN | TEMPERATURE: 98.2 F | WEIGHT: 171 LBS | BODY MASS INDEX: 30.3 KG/M2 | DIASTOLIC BLOOD PRESSURE: 78 MMHG | RESPIRATION RATE: 18 BRPM

## 2023-05-02 PROCEDURE — 99214 OFFICE O/P EST MOD 30 MIN: CPT | Mod: 25

## 2023-05-02 PROCEDURE — 93000 ELECTROCARDIOGRAM COMPLETE: CPT

## 2023-05-02 NOTE — HISTORY OF PRESENT ILLNESS
[FreeTextEntry1] : f/u HTN/ HL /DM \par \par taking meds except Januvia which she cannot afford \par no AE \par  no chest pain or SOB\par going for eye surgery

## 2023-05-02 NOTE — PHYSICAL EXAM
[No Acute Distress] : no acute distress [Well Nourished] : well nourished [Normal Voice/Communication] : normal voice/communication [Normal Outer Ear/Nose] : the outer ears and nose were normal in appearance [No JVD] : no jugular venous distention [Normal] : soft, non-tender, non-distended, no masses palpated, no HSM and normal bowel sounds

## 2023-05-02 NOTE — ASSESSMENT
[FreeTextEntry1] : 1) DM \par - ct meds \par - check A1C \par - optho- UTD \par \par 2) HL/HTN\par - ct meds \par - check lipid / BMP \par \par 3) EKG part of pre- op \par - no contraindication for sx

## 2023-05-03 LAB
ALBUMIN SERPL ELPH-MCNC: 4.7 G/DL
ALP BLD-CCNC: 82 U/L
ALT SERPL-CCNC: 15 U/L
ANION GAP SERPL CALC-SCNC: 14 MMOL/L
AST SERPL-CCNC: 13 U/L
BILIRUB SERPL-MCNC: 0.3 MG/DL
BUN SERPL-MCNC: 12 MG/DL
CALCIUM SERPL-MCNC: 10.5 MG/DL
CHLORIDE SERPL-SCNC: 100 MMOL/L
CHOLEST SERPL-MCNC: 201 MG/DL
CO2 SERPL-SCNC: 28 MMOL/L
CREAT SERPL-MCNC: 0.68 MG/DL
EGFR: 92 ML/MIN/1.73M2
ESTIMATED AVERAGE GLUCOSE: 157 MG/DL
GLUCOSE SERPL-MCNC: 111 MG/DL
HBA1C MFR BLD HPLC: 7.1 %
HDLC SERPL-MCNC: 53 MG/DL
LDLC SERPL CALC-MCNC: 107 MG/DL
NONHDLC SERPL-MCNC: 148 MG/DL
POTASSIUM SERPL-SCNC: 4.6 MMOL/L
PROT SERPL-MCNC: 7.9 G/DL
SODIUM SERPL-SCNC: 142 MMOL/L
TRIGL SERPL-MCNC: 207 MG/DL

## 2023-05-08 ENCOUNTER — NON-APPOINTMENT (OUTPATIENT)
Age: 73
End: 2023-05-08

## 2023-05-08 ENCOUNTER — APPOINTMENT (OUTPATIENT)
Dept: CARDIOLOGY | Facility: CLINIC | Age: 73
End: 2023-05-08
Payer: MEDICARE

## 2023-05-08 VITALS
OXYGEN SATURATION: 96 % | WEIGHT: 171 LBS | BODY MASS INDEX: 30.3 KG/M2 | HEIGHT: 63 IN | HEART RATE: 86 BPM | DIASTOLIC BLOOD PRESSURE: 75 MMHG | SYSTOLIC BLOOD PRESSURE: 121 MMHG

## 2023-05-08 PROCEDURE — 93000 ELECTROCARDIOGRAM COMPLETE: CPT

## 2023-05-08 PROCEDURE — 99214 OFFICE O/P EST MOD 30 MIN: CPT | Mod: 25

## 2023-05-08 NOTE — DISCUSSION/SUMMARY
[EKG obtained to assist in diagnosis and management of assessed problem(s)] : EKG obtained to assist in diagnosis and management of assessed problem(s) [FreeTextEntry1] : 73 year old woman with history of DM (on Metformin), HLD (on atorvastatin) and anxiety (on depakote and seroquel) who is here for follow up. \par \par # HTN - acceptable range \par Continue Asa 81 mg QD \par Continue Metoprolol ER 25 mg QD\par EKG without ischemia\par # HLD - Continue Atorvastatin 20 mg QD .\par # DM - Continue Metformin 500mg QD \par # Medically optimized for cataract surgery.\par #FU in 6 months\par

## 2023-05-08 NOTE — HISTORY OF PRESENT ILLNESS
[FreeTextEntry1] : 73 year old woman with history of DM (on Metformin), HLD (on atorvastatin) and anxiety (on depakote and seroquel) who is here for follow up. A Nuclear stress test performed by an outside cardiologist in 10/2020 showed normal LV function however small, mild inferior and inferoseptal ischemia. \par TTE done as outpatient 10/3/2020: that showed kaycee LV function\par \par EKG reviewed and normal\par having cataract surgery next week\par BP controlled\par # HTN - acceptable range \par Continue Asa 81 mg QD \par Continue Metoprolol ER 25 mg QD\par EKG without ischemia\par # HLD - Continue Atorvastatin 20 mg QD .\par # DM - Continue Metformin 500mg QD \par # Medically optimized for cataract surgery.

## 2023-05-10 ENCOUNTER — APPOINTMENT (OUTPATIENT)
Dept: SURGICAL ONCOLOGY | Facility: CLINIC | Age: 73
End: 2023-05-10
Payer: MEDICARE

## 2023-05-10 VITALS
HEIGHT: 63 IN | DIASTOLIC BLOOD PRESSURE: 82 MMHG | RESPIRATION RATE: 16 BRPM | OXYGEN SATURATION: 98 % | SYSTOLIC BLOOD PRESSURE: 144 MMHG | WEIGHT: 174 LBS | BODY MASS INDEX: 30.83 KG/M2 | HEART RATE: 89 BPM

## 2023-05-10 PROCEDURE — 99214 OFFICE O/P EST MOD 30 MIN: CPT

## 2023-05-10 NOTE — PHYSICAL EXAM
[Normal] : supple, no neck mass and thyroid not enlarged [Normal Neck Lymph Nodes] : normal neck lymph nodes  [Normal Supraclavicular Lymph Nodes] : normal supraclavicular lymph nodes [Normal Groin Lymph Nodes] : normal groin lymph nodes [Normal Axillary Lymph Nodes] : normal axillary lymph nodes [Normal] : oriented to person, place and time, with appropriate affect [de-identified] : right areolar scar well healed.  no masses or adenopathy bilaterally. right upper chest scar well healed.

## 2023-05-10 NOTE — HISTORY OF PRESENT ILLNESS
[de-identified] : Patient is a 72 y/o female who presents a follow up visit. She is s/p ANTHONY  localized right lumpectomy for an intraductal papilloma and excision of right chest wall mass on 1/11/2021. \par \par Bilateral mammo/sono performed on 3/31/23 showed no mammographic or sonographic evidence of malignancy. (BI-RADS 2)\par \par Bilateral mammo/sono performed on 3/14/22 showed no mammographic or sonographic evidence of malignancy. (BI-RADS 2)\par \par Final pathology: \par 1) Right infraclavicular mass excision:  Breast tissue with scattered benign ducts and abundant adipose tissue\par 2) Right breast 12-1:00 lumpectomy:  Proliferative fibrocystic changes with foci of usual ductal epithelial hyperplasia, adenosis and stromal fibrosis.  Microcalcifications focally present in benign ducts and lobules.  Biopsy site changes. No papilloma present. \par \par \par Previous pertinent history:\par Pt. was referred by Dr. Bella Mckeon on 12/23/2020 for newly diagnosed right breast IDP.  \par \par Pathology (10/30/20): \par Breast, right, upper central, with ca++, stereotactic vacuum-assisted core biopsy\par - Columnar cell change with coarse calcifications\par - Proliferative fibrocystic changes with nodular sclerosing adenosis and microcalcifications\par - Non-proliferative fibrocystic change with dense stromal fibrosis and microcyst formation\par - Fibroadenomatoid change\par - Fat necrosis\par \par Bilateral US (10/23/20): Grouping of microcalcifications in the upper central right breast for which stereotactic guided biopsy is recommended. BI-RADS 4B.\par \par Pt reports she has DM.\par She is fully Covid vaccinated.

## 2023-05-10 NOTE — ADDENDUM
[FreeTextEntry1] : I, Kaleigh Dick, acted solely as a scribe for Dr. Shashank Franco on this date 05/10/2023. \par \par \par \par \par \par

## 2023-05-10 NOTE — ASSESSMENT
[FreeTextEntry1] : S/p right lumpectomy for an intraductal papilloma and excision of right chest wall mass on 1/11/2021\par BI-RADS 2 imaging March 2023\par Normal PE\par Continue yearly breast imaging March 2024\par RTO 1 year

## 2023-06-13 ENCOUNTER — TRANSCRIPTION ENCOUNTER (OUTPATIENT)
Age: 73
End: 2023-06-13

## 2023-06-20 ENCOUNTER — RX RENEWAL (OUTPATIENT)
Age: 73
End: 2023-06-20

## 2023-07-18 ENCOUNTER — APPOINTMENT (OUTPATIENT)
Dept: DERMATOLOGY | Facility: CLINIC | Age: 73
End: 2023-07-18
Payer: MEDICARE

## 2023-07-18 VITALS — HEIGHT: 63 IN | WEIGHT: 174 LBS | BODY MASS INDEX: 30.83 KG/M2

## 2023-07-18 DIAGNOSIS — L81.4 OTHER MELANIN HYPERPIGMENTATION: ICD-10-CM

## 2023-07-18 DIAGNOSIS — D22.9 MELANOCYTIC NEVI, UNSPECIFIED: ICD-10-CM

## 2023-07-18 DIAGNOSIS — L82.1 OTHER SEBORRHEIC KERATOSIS: ICD-10-CM

## 2023-07-18 DIAGNOSIS — L91.8 OTHER HYPERTROPHIC DISORDERS OF THE SKIN: ICD-10-CM

## 2023-07-18 DIAGNOSIS — Z12.83 ENCOUNTER FOR SCREENING FOR MALIGNANT NEOPLASM OF SKIN: ICD-10-CM

## 2023-07-18 PROCEDURE — 99203 OFFICE O/P NEW LOW 30 MIN: CPT | Mod: 25

## 2023-07-18 PROCEDURE — 17110 DESTRUCTION B9 LES UP TO 14: CPT

## 2023-07-18 NOTE — PHYSICAL EXAM
[FreeTextEntry3] : PE: \par General: well-appearing, alert, in no acute distress\par \par Full body skin exam performed examining scalp, head, face, ears, eyes, mouth, neck, chest, back, abdomen, axilla, buttock, b/l arms, b/l forearms, b/l hands, b/l fingernails, b/l thighs, b/l legs, b/l feet, b/l toenails. Groin and genitalia deferred per patient. Pertinent findings include:\par -brown  stuck on appearing plaques on trunk and extremities, including R breast and L abdomen \par - trunk and extremities with many tan and brown regular macules; dermoscopy with benign features\par - brown pedunculated papule on L medial thigh\par \par

## 2023-07-18 NOTE — ASSESSMENT
[FreeTextEntry1] : # Inflamed skin tag,  left medial thigh/groin\par - as inflamed and irritated since it rubs, treated with cryo as below:\par --Cryotherapy performed:\par Benefits/Risks/adverse effects discussed: erythema, blistering, dyspigmentation (hypo/hyper), scar, need for multiple treatment, persistence/recurrence.\par Lesion number: 1\par #freeze-thaw cycles to each lesion: 2\par Thaw time: 5s\par Wound care discussed\par \par #Lentigines\par #SKs\par - education, counseling, reassurance\par \par #Multiple melanocytic nevi, benign\par - education, counseling, reassurance\par - ABCDEs of melanoma reviewed, rtc sooner if changing\par \par FBSE/Healthcare maintenance\par -Sun protection was discussed. The proper use of broad-spectrum UVA/UVB sunscreens with SPF 30 or greater was reviewed and the need for re-application after swimming or sweating or 2-3 hours was emphasized. We talked about judicious use of clothing and avoidance of peak periods of sun exposure. I made the patient aware of the need for year-round protection. ABCDEs of melanoma were also reviewed.\par -Self-skin checks were also reviewed, rtc sooner if changed noted\par -Counseled patient to monitor for changes\par - FBSE completed today, no lesions concerning for malignancy. Next FBSE due 1 year\par

## 2023-07-18 NOTE — HISTORY OF PRESENT ILLNESS
[FreeTextEntry1] : NPV: spots on skin [de-identified] : 73F here for evaluation of new spots on her breast and abdomen. Began 2 years ago, asx. Would also like a skin check. \par \par No personal or family history of skin cancer \par \par Moisturizer: none in particular\par Sunscreen: occasionally, mostly before goign to the beach

## 2023-07-24 ENCOUNTER — APPOINTMENT (OUTPATIENT)
Dept: OBGYN | Facility: CLINIC | Age: 73
End: 2023-07-24
Payer: MEDICARE

## 2023-07-24 VITALS
SYSTOLIC BLOOD PRESSURE: 113 MMHG | WEIGHT: 174 LBS | BODY MASS INDEX: 30.83 KG/M2 | DIASTOLIC BLOOD PRESSURE: 67 MMHG | HEIGHT: 63 IN

## 2023-07-24 DIAGNOSIS — Z83.3 FAMILY HISTORY OF DIABETES MELLITUS: ICD-10-CM

## 2023-07-24 DIAGNOSIS — Z01.419 ENCOUNTER FOR GYNECOLOGICAL EXAMINATION (GENERAL) (ROUTINE) W/OUT ABNORMAL FINDINGS: ICD-10-CM

## 2023-07-24 PROCEDURE — 99397 PER PM REEVAL EST PAT 65+ YR: CPT

## 2023-07-24 RX ORDER — OMEGA-3/DHA/EPA/FISH OIL 300-1000MG
CAPSULE ORAL
Refills: 0 | Status: DISCONTINUED | COMMUNITY
End: 2023-07-24

## 2023-07-24 NOTE — REVIEW OF SYSTEMS
Occupational Therapy    Visit Type: initial evaluation  Co-treat with: Physical therapist (due to cognition, safety, and questionable participation)  SUBJECTIVE  Patient agreed to participate in therapy this date.  Patient verbally agrees to allow the following to be present during session: daughter  \"Just wait a minute.\"  Patient / Family Goal: unable to state    Pain     At onset of session (out of 10): 0    OBJECTIVE     Cognitive Status   Level of Consciousness   - alert  Arousal Alertness   - delayed responses to stimuli  Affect/Behavior    - confused and anxious (resistive)  Orientation    - Oriented to: person   - Disoriented to: place, time and situation  Functional Communication   - Overall Status: impaired   - Forms of Communication: verbal  Attention Span    - Attention: - difficulty attending to directions - difficulty dividing attention   - Attention impairment: distractibility and reduced memory  Following Direction   - follows one step commands inconsistently  Executive Function    - Organization: impaired   - Sequencing: impaired   - Problem Solving: impaired   - Termination: impaired   - Initiation: impaired   - Time Management: impaired   - Planning: impaired   - Execution: impaired  Memory   - impaired  Safety Awareness/Insight   - impaired, decreased awareness of need for assistance and decreased awareness of need for safety  Awareness of Deficits   - decreased awareness of deficits    Hand Dominance: right-handed    UE Function:    • Left: normal    • Right: normal      Range of Motion (ROM)   (degrees unless noted; active unless noted; norms in ( ); negative=lacking to 0, positive=beyond 0)  WFL: LUE, RUE    Strength  (out of 5 unless noted, standard test position unless noted)   Gross :  strength grossly equal bilateral      Sitting Balance  (EDWAR = base of support)  Static      - Trial 1 details: moderate assist, with double UE support, with double LE support and with back  unsupported       Bed Mobility  - Supine to sit: maximal assist, with tactile cues, with verbal cues  - Sit to supine: total assist - non-dependent, 2 persons, with tactile cues, with verbal cues  Pt resistive to therapist commands and assistance, required significant increased time and encouragement as well as daughter coaching to participate.    Transfers  Assistive devices: hand hold, 2-wheeled walker  - Sit to stand: total assist - non-dependent, 2 persons, with verbal cues, with tactile cues  - Stand to sit: total assist - non-dependent, 2 persons, with verbal cues, with tactile cues  Pt performed one trial with RW and two more trails with bilateral hand held assist. Pt not able to achieve full stand on any attempt but able to lift bottom slightly off of bed. Pt required increased time, encouragement and assist of daughters cueing to promote engagement but pt continously resistive.        Functional Ambulation  : unable to take steps.  Activities of Daily Living (ADLs)  Grooming/Oral Hygiene:   - Grooming assist: stand by assist (upright in bed)  - Assist needed for: wash/dry face  Lower Body Dressing:   - Footwear:       - Assistance: total assist - dependent        - Position: supine/bed  - Assist needed for: don/doff right sock and don/doff left sock  Toileting:   - Assist: total assist - dependent  (Soiled upon arrival.)  - Assist needed for: perineal hygiene  Interventions    Training provided: ADL training, balance retraining, activity tolerance, bed mobility training, body mechanics, breathing/relaxation, positioning, safety training, transfer training and postural re-education  This session, pt educated on POC, OTs role, DME, fall prevention, importance of hygiene/bed mobility with hospital staff and compensatory techniques/energy conservations strategies to promote safety and independence within pt ADL routine.      Skilled input: verbal instruction/cues, tactile instruction/cues, as detailed above and  posture correction  Verbal Consent: Writer verbally educated and received verbal consent for hand placement, positioning of patient, and techniques to be performed today from patient for clothing adjustments for techniques and therapist position for techniques as described above and how they are pertinent to the patient's plan of care.         Education:   - Present and ready to learn: patient's family  - Present and not ready to learn: patient  Education provided during session:  - Results of above outlined education: Verbalizes understanding and Needs reinforcement    ASSESSMENT   Patient will benefit from inpatient skilled therapy to address current assessed functional limitations and impairments.  Interferring components: decreased activity tolerance, decreased insight into deficit, cognitive deficits, minimal participation and mood/coping    Summary of function and discharge needs based on today's assessment:  - Current level of function: slightly below baseline level of function  - Therapy needs at discharge: therapy 1-3 times per week  - Activities of daily living (ADLs) requiring support at discharge: dressing, toileting, bed mobility, transfers, ambulation, grooming, bathing and feeding  - Impairments that require further therapy intervention: pain, activity tolerance, balance, safety awareness and cognition  AM-PAC  - Prior Level of Function: Needs > MOD A ([facility] <12)       Key: MOD A=moderate assistance, IND/MOD I=independent/modified independent  - Generalized Current Level of Function     - Current Self-Cares: 11       Scoring Key= >21 Modified Independent; 20-21 Supervision; 18-19 Minimal assist; 13-18 Moderate assist; 9-12 Max assist; <9 Total assist      [facility] Cognition Screen:    1. Following/understanding a 10 to 15 minute speech or presentation (e.g., lesson at a place of [employer], guest lecturer at a senior center?  Total (1)    2. Understanding familiar people during ordinary conversations?   Total (1)    3. Remembering to take medications at the appropriate time?  Total (1)    4. Remembering where things were placed or put away (e.g., keys)?  Total (1)    5. Remembering a list of 4 or 5 errands without writing it down?  Total (1)    6. Taking care of complicated tasks like managing a checking account or getting appliances fixed?  Total (1)    AM-PAC Cognition Screen:  Cognition Score: 6/24  Cognition Interpretation: current level of function is consistent with baseline         • Personal Occupations Profile Affected: bathing/showering, functional mobility/transfers, personal hygiene/grooming, toileting/toilet hygiene, lower body dressing, feeding, upper body dressing, social participation family      • Clinical decision making: Low - Patient has few limitations (1-3), comorbidities and/or complexities, as noted in problem focused assessment noted above, that impact their occupational profile.  Resulting in few treatment options and no task modification consistent with low clinical decision making complexity.    PLAN  Suggestions for next session as indicated:   PT/OT Mobility Equipment for Discharge: RW and wc (Pt owns)  PT/OT ADL Equipment for Discharge: hospital bed  Interventions: ADL retraining, functional transfer training, activity tolerance training, patient/family training, balance, positioning, energy conservation, safety training, transfer training, therapeutic exercise, therapeutic activity, patient education, community integration, IADL and bed mobility training  Agreement to plan and goals: patient agrees with goals and treatment plan      GOALS  Long Term Goals: (to be met by time of discharge from hospital)  Grooming: Patient will complete grooming tasks in sitting supervision.  Lower body dressing: Patient will complete lower body dressing supervision.  Toileting: Patient will complete toileting moderate assist.  Sit (edge of bed): Patient will sit at edge of bed for 15 mins,  supervision.     Pt will perform sit>stand transfer with mod A in prep for toilet transfer (05/22/23).        Documented in the chart in the following areas: Prior Level of Function. Assessment/Plan.    Patient at End of Session:   Location: in bed  Safety measures: call light within reach, bed rails x4 and alarm system in place/re-engaged  Handoff to: nurse and family/caregiver      Therapy procedure time and total treatment time can be found documented on the Time Entry flowsheet   [Negative] : Breast

## 2023-07-31 NOTE — PLAN
[FreeTextEntry1] : 74 yo female presents as a new patient: -patient reports all normal pap smears, with her last one being a few years ago. Advised patient that pap smears usually stop at the age of 66 as long as all previous pap smears are WNL. -f/u prn

## 2023-07-31 NOTE — HISTORY OF PRESENT ILLNESS
[Patient reported mammogram was normal] : Patient reported mammogram was normal [Menopause Age: ____] : age at menopause was [unfilled] [Currently Active] : currently active [Men] : men [Vaginal] : vaginal [No] : No [Patient refuses STI testing] : Patient refuses STI testing [Mammogramdate] : 2023 [PGHxTotal] : 3 [facility] [PGHxPremature] : 0 [PGHxAbortions] : 0 [[facility]] : 2 [PGHxABInduced] : 0 [PGHxABSpont] : 1 [PGHxEctopic] : 0 [PGHxMultBirths] : 0 [FreeTextEntry1] :  x2

## 2023-08-01 ENCOUNTER — APPOINTMENT (OUTPATIENT)
Dept: INTERNAL MEDICINE | Facility: CLINIC | Age: 73
End: 2023-08-01
Payer: MEDICARE

## 2023-08-01 VITALS
HEIGHT: 64.25 IN | SYSTOLIC BLOOD PRESSURE: 116 MMHG | DIASTOLIC BLOOD PRESSURE: 60 MMHG | TEMPERATURE: 98.5 F | OXYGEN SATURATION: 97 % | BODY MASS INDEX: 29.19 KG/M2 | HEART RATE: 92 BPM | WEIGHT: 171 LBS

## 2023-08-01 PROCEDURE — 99214 OFFICE O/P EST MOD 30 MIN: CPT

## 2023-08-01 NOTE — ASSESSMENT
[FreeTextEntry1] : 1) DM   - last A1C 7.1 -Ct metformin  - increase exercise  - optho  - check A1C   2)  HTN - well controlled - Cr 0.6  - ct metoprolol / losartan   3) HL - ct atorva - last

## 2023-08-01 NOTE — PHYSICAL EXAM
[No Acute Distress] : no acute distress [Well Nourished] : well nourished [Well Developed] : well developed [Normal Voice/Communication] : normal voice/communication [Normal Sclera/Conjunctiva] : normal sclera/conjunctiva [Normal Outer Ear/Nose] : the outer ears and nose were normal in appearance [No JVD] : no jugular venous distention [Normal] : soft, non-tender, non-distended, no masses palpated, no HSM and normal bowel sounds

## 2023-08-01 NOTE — HISTORY OF PRESENT ILLNESS
[FreeTextEntry1] : here for f.u   HTN/ HL / DM    compliant w./, follow up  no chest pain or shortness of breath \exercise-  on and off

## 2023-08-02 LAB
ANION GAP SERPL CALC-SCNC: 14 MMOL/L
BUN SERPL-MCNC: 17 MG/DL
CALCIUM SERPL-MCNC: 9.6 MG/DL
CHLORIDE SERPL-SCNC: 102 MMOL/L
CO2 SERPL-SCNC: 24 MMOL/L
CREAT SERPL-MCNC: 0.77 MG/DL
EGFR: 82 ML/MIN/1.73M2
ESTIMATED AVERAGE GLUCOSE: 148 MG/DL
GLUCOSE SERPL-MCNC: 116 MG/DL
HBA1C MFR BLD HPLC: 6.8 %
POTASSIUM SERPL-SCNC: 4.6 MMOL/L
SODIUM SERPL-SCNC: 140 MMOL/L

## 2023-08-08 ENCOUNTER — RX RENEWAL (OUTPATIENT)
Age: 73
End: 2023-08-08

## 2023-08-08 RX ORDER — BLOOD SUGAR DIAGNOSTIC
STRIP MISCELLANEOUS
Qty: 100 | Refills: 0 | Status: ACTIVE | COMMUNITY
Start: 2020-06-22 | End: 1900-01-01

## 2023-08-21 NOTE — ASU PREOP CHECKLIST - AICD PRESENT
CHF teaching completed. CHF background completed. Teaching emphasis on Call Cardiology STAT ,if any of the following are noted:  Short of Breath; with activity or without/ while reclined, Generalize weakness, edema are noted individually or grouped. Cardiac Diet  and salt/fluid restrictions covered. Daily WTs emphasized. Planner with summarization sheet provided with cardiology service and phone number and bathroom scale offered. Total time @ BS is 1 hour and pt verbalize understanding/past post test.  Pt instructed to call myself, if any questions occur. Sister at bedside. Patient and sister wish to follow with [facility] as outpatient as well as continue with Dr. Behzad Phipps. no

## 2023-11-13 ENCOUNTER — APPOINTMENT (OUTPATIENT)
Dept: CARDIOLOGY | Facility: CLINIC | Age: 73
End: 2023-11-13
Payer: MEDICARE

## 2023-11-13 VITALS
DIASTOLIC BLOOD PRESSURE: 77 MMHG | TEMPERATURE: 97.7 F | RESPIRATION RATE: 16 BRPM | OXYGEN SATURATION: 98 % | SYSTOLIC BLOOD PRESSURE: 126 MMHG | BODY MASS INDEX: 29.98 KG/M2 | HEART RATE: 85 BPM | WEIGHT: 176 LBS

## 2023-11-13 DIAGNOSIS — R00.2 PALPITATIONS: ICD-10-CM

## 2023-11-13 PROCEDURE — 99214 OFFICE O/P EST MOD 30 MIN: CPT | Mod: 25

## 2023-11-13 PROCEDURE — 93000 ELECTROCARDIOGRAM COMPLETE: CPT

## 2023-11-22 ENCOUNTER — APPOINTMENT (OUTPATIENT)
Dept: INTERNAL MEDICINE | Facility: CLINIC | Age: 73
End: 2023-11-22
Payer: MEDICARE

## 2023-11-22 VITALS
WEIGHT: 176 LBS | SYSTOLIC BLOOD PRESSURE: 124 MMHG | HEIGHT: 64.25 IN | HEART RATE: 88 BPM | DIASTOLIC BLOOD PRESSURE: 79 MMHG | BODY MASS INDEX: 30.05 KG/M2 | RESPIRATION RATE: 16 BRPM | OXYGEN SATURATION: 98 %

## 2023-11-22 PROCEDURE — G0439: CPT

## 2023-11-24 LAB
ESTIMATED AVERAGE GLUCOSE: 174 MG/DL
HBA1C MFR BLD HPLC: 7.7 %
HCT VFR BLD CALC: 39.9 %
HGB BLD-MCNC: 13 G/DL
MCHC RBC-ENTMCNC: 32.6 GM/DL
MCHC RBC-ENTMCNC: 32.8 PG
MCV RBC AUTO: 100.8 FL
PLATELET # BLD AUTO: 259 K/UL
RBC # BLD: 3.96 M/UL
RBC # FLD: 11.9 %
TSH SERPL-ACNC: 0.45 UIU/ML
WBC # FLD AUTO: 6.46 K/UL

## 2024-01-03 ENCOUNTER — OUTPATIENT (OUTPATIENT)
Dept: OUTPATIENT SERVICES | Facility: HOSPITAL | Age: 74
LOS: 1 days | End: 2024-01-03
Payer: MEDICARE

## 2024-01-03 ENCOUNTER — APPOINTMENT (OUTPATIENT)
Dept: RADIOLOGY | Facility: IMAGING CENTER | Age: 74
End: 2024-01-03
Payer: MEDICARE

## 2024-01-03 DIAGNOSIS — Z98.890 OTHER SPECIFIED POSTPROCEDURAL STATES: Chronic | ICD-10-CM

## 2024-01-03 DIAGNOSIS — Z00.8 ENCOUNTER FOR OTHER GENERAL EXAMINATION: ICD-10-CM

## 2024-01-03 DIAGNOSIS — Z90.710 ACQUIRED ABSENCE OF BOTH CERVIX AND UTERUS: Chronic | ICD-10-CM

## 2024-01-03 PROCEDURE — 77080 DXA BONE DENSITY AXIAL: CPT | Mod: 26

## 2024-01-03 PROCEDURE — 77080 DXA BONE DENSITY AXIAL: CPT

## 2024-02-06 ENCOUNTER — RX RENEWAL (OUTPATIENT)
Age: 74
End: 2024-02-06

## 2024-02-14 NOTE — ED PROVIDER NOTE - CHPI ED SYMPTOMS NEG
- chest pain, - abdominal pain, - unsteady gait, - urinary or fecal incontinence/no loss of consciousness
No

## 2024-02-22 ENCOUNTER — APPOINTMENT (OUTPATIENT)
Dept: INTERNAL MEDICINE | Facility: CLINIC | Age: 74
End: 2024-02-22

## 2024-04-12 ENCOUNTER — RX RENEWAL (OUTPATIENT)
Age: 74
End: 2024-04-12

## 2024-04-16 ENCOUNTER — TRANSCRIPTION ENCOUNTER (OUTPATIENT)
Age: 74
End: 2024-04-16

## 2024-04-16 DIAGNOSIS — Z00.00 ENCOUNTER FOR GENERAL ADULT MEDICAL EXAMINATION W/OUT ABNORMAL FINDINGS: ICD-10-CM

## 2024-04-23 ENCOUNTER — RESULT REVIEW (OUTPATIENT)
Age: 74
End: 2024-04-23

## 2024-04-23 ENCOUNTER — APPOINTMENT (OUTPATIENT)
Dept: MAMMOGRAPHY | Facility: IMAGING CENTER | Age: 74
End: 2024-04-23
Payer: MEDICARE

## 2024-04-23 ENCOUNTER — APPOINTMENT (OUTPATIENT)
Dept: ULTRASOUND IMAGING | Facility: IMAGING CENTER | Age: 74
End: 2024-04-23
Payer: MEDICARE

## 2024-04-23 ENCOUNTER — OUTPATIENT (OUTPATIENT)
Dept: OUTPATIENT SERVICES | Facility: HOSPITAL | Age: 74
LOS: 1 days | End: 2024-04-23
Payer: MEDICARE

## 2024-04-23 DIAGNOSIS — Z00.00 ENCOUNTER FOR GENERAL ADULT MEDICAL EXAMINATION WITHOUT ABNORMAL FINDINGS: ICD-10-CM

## 2024-04-23 DIAGNOSIS — Z90.710 ACQUIRED ABSENCE OF BOTH CERVIX AND UTERUS: Chronic | ICD-10-CM

## 2024-04-23 DIAGNOSIS — Z98.890 OTHER SPECIFIED POSTPROCEDURAL STATES: Chronic | ICD-10-CM

## 2024-04-23 PROCEDURE — 77067 SCR MAMMO BI INCL CAD: CPT | Mod: 26

## 2024-04-23 PROCEDURE — 76641 ULTRASOUND BREAST COMPLETE: CPT | Mod: 26,50

## 2024-04-23 PROCEDURE — 77063 BREAST TOMOSYNTHESIS BI: CPT | Mod: 26

## 2024-04-23 PROCEDURE — 76641 ULTRASOUND BREAST COMPLETE: CPT

## 2024-04-23 PROCEDURE — 77063 BREAST TOMOSYNTHESIS BI: CPT

## 2024-04-23 PROCEDURE — 77067 SCR MAMMO BI INCL CAD: CPT

## 2024-05-08 ENCOUNTER — APPOINTMENT (OUTPATIENT)
Dept: SURGICAL ONCOLOGY | Facility: CLINIC | Age: 74
End: 2024-05-08
Payer: MEDICARE

## 2024-05-08 VITALS
HEIGHT: 64.25 IN | WEIGHT: 174 LBS | RESPIRATION RATE: 17 BRPM | DIASTOLIC BLOOD PRESSURE: 76 MMHG | HEART RATE: 87 BPM | OXYGEN SATURATION: 96 % | SYSTOLIC BLOOD PRESSURE: 129 MMHG | BODY MASS INDEX: 29.71 KG/M2

## 2024-05-08 DIAGNOSIS — D36.9 BENIGN NEOPLASM, UNSPECIFIED SITE: ICD-10-CM

## 2024-05-08 PROCEDURE — 99213 OFFICE O/P EST LOW 20 MIN: CPT

## 2024-05-08 NOTE — PHYSICAL EXAM
[Normal] : supple, no neck mass and thyroid not enlarged [Normal Neck Lymph Nodes] : normal neck lymph nodes  [Normal Supraclavicular Lymph Nodes] : normal supraclavicular lymph nodes [Normal Groin Lymph Nodes] : normal groin lymph nodes [Normal Axillary Lymph Nodes] : normal axillary lymph nodes [Normal] : oriented to person, place and time, with appropriate affect [de-identified] : right areolar scar well healed.  no masses or adenopathy bilaterally. right upper chest scar well healed.

## 2024-05-08 NOTE — HISTORY OF PRESENT ILLNESS
[de-identified] : Patient is a 75 y/o female who presents a follow up visit. She is s/p ANTHONY  localized right lumpectomy for an intraductal papilloma and excision of right chest wall mass on 1/11/2021.   B/l MMG/US (4/23/24): No mammographic or sonographic evidence of malignancy. BI-RADS 2  Bilateral mammo/sono performed on 3/31/23 showed no mammographic or sonographic evidence of malignancy. (BI-RADS 2)  Bilateral mammo/sono performed on 3/14/22 showed no mammographic or sonographic evidence of malignancy. (BI-RADS 2)  Final pathology:  1) Right infraclavicular mass excision:  Breast tissue with scattered benign ducts and abundant adipose tissue 2) Right breast 12-1:00 lumpectomy:  Proliferative fibrocystic changes with foci of usual ductal epithelial hyperplasia, adenosis and stromal fibrosis.  Microcalcifications focally present in benign ducts and lobules.  Biopsy site changes. No papilloma present.    Previous pertinent history: Pt. was referred by Dr. Bella Mckeon on 12/23/2020 for newly diagnosed right breast IDP.    Pathology (10/30/20):  Breast, right, upper central, with ca++, stereotactic vacuum-assisted core biopsy - Columnar cell change with coarse calcifications - Proliferative fibrocystic changes with nodular sclerosing adenosis and microcalcifications - Non-proliferative fibrocystic change with dense stromal fibrosis and microcyst formation - Fibroadenomatoid change - Fat necrosis  Bilateral US (10/23/20): Grouping of microcalcifications in the upper central right breast for which stereotactic guided biopsy is recommended. BI-RADS 4B.  Denies palpable breast masses, nipple discharge, inversion or skin change. Pt reports she has DM. She is fully Covid vaccinated.  No family history of breast or ovarian cancer. No history of HRT use.

## 2024-05-08 NOTE — ASSESSMENT
[FreeTextEntry1] : S/p right lumpectomy for an intraductal papilloma and excision of right chest wall mass on 1/11/2021 BI-RADS 2 imaging April 2024 Normal PE Continue yearly breast imaging April 2025 RTO 1 year

## 2024-06-04 ENCOUNTER — APPOINTMENT (OUTPATIENT)
Dept: INTERNAL MEDICINE | Facility: CLINIC | Age: 74
End: 2024-06-04
Payer: MEDICARE

## 2024-06-04 VITALS
HEIGHT: 63.5 IN | HEART RATE: 81 BPM | OXYGEN SATURATION: 97 % | TEMPERATURE: 97.8 F | DIASTOLIC BLOOD PRESSURE: 75 MMHG | WEIGHT: 176 LBS | BODY MASS INDEX: 30.8 KG/M2 | SYSTOLIC BLOOD PRESSURE: 132 MMHG

## 2024-06-04 DIAGNOSIS — E78.5 HYPERLIPIDEMIA, UNSPECIFIED: ICD-10-CM

## 2024-06-04 DIAGNOSIS — I10 ESSENTIAL (PRIMARY) HYPERTENSION: ICD-10-CM

## 2024-06-04 DIAGNOSIS — E11.9 TYPE 2 DIABETES MELLITUS W/OUT COMPLICATIONS: ICD-10-CM

## 2024-06-04 PROCEDURE — 99214 OFFICE O/P EST MOD 30 MIN: CPT

## 2024-06-04 RX ORDER — ATORVASTATIN CALCIUM 40 MG/1
40 TABLET, FILM COATED ORAL
Qty: 100 | Refills: 2 | Status: ACTIVE | COMMUNITY
Start: 2020-07-16 | End: 1900-01-01

## 2024-06-04 RX ORDER — LOSARTAN POTASSIUM 25 MG/1
25 TABLET, FILM COATED ORAL
Qty: 100 | Refills: 2 | Status: ACTIVE | COMMUNITY
Start: 2022-11-01 | End: 1900-01-01

## 2024-06-04 RX ORDER — METOPROLOL SUCCINATE 25 MG/1
25 TABLET, EXTENDED RELEASE ORAL
Qty: 90 | Refills: 3 | Status: ACTIVE | COMMUNITY
Start: 2020-10-08 | End: 1900-01-01

## 2024-06-04 RX ORDER — METFORMIN HYDROCHLORIDE 1000 MG/1
1000 TABLET, COATED ORAL
Qty: 200 | Refills: 2 | Status: ACTIVE | COMMUNITY
Start: 2023-01-10 | End: 1900-01-01

## 2024-06-04 NOTE — HISTORY OF PRESENT ILLNESS
[FreeTextEntry1] : f/u DM / HTN/ HL  taking meds  no AE   no chest pian or SOB   surgery-onc note reviewed

## 2024-06-04 NOTE — ASSESSMENT
[FreeTextEntry1] : 1) DM - last A1C 7.7 - has been exercising more  - check A1C - optho  2) HTN  - controlled - ct losartan 25 - check BMP  3) HL  - ct lipitor 40 - low fat diet - [facility] lipid panel/ LFT   mammo / BMD - UTD   needs colonoscopy this year - 3 year f/u ( poor prep)

## 2024-06-04 NOTE — PHYSICAL EXAM
[No Acute Distress] : no acute distress [Normal Sclera/Conjunctiva] : normal sclera/conjunctiva [Normal Outer Ear/Nose] : the outer ears and nose were normal in appearance [Normal] : soft, non-tender, non-distended, no masses palpated, no HSM and normal bowel sounds

## 2024-06-05 LAB
ALBUMIN SERPL ELPH-MCNC: 4.2 G/DL
ALP BLD-CCNC: 82 U/L
ALT SERPL-CCNC: 18 U/L
ANION GAP SERPL CALC-SCNC: 12 MMOL/L
AST SERPL-CCNC: 16 U/L
BILIRUB SERPL-MCNC: 0.3 MG/DL
BUN SERPL-MCNC: 13 MG/DL
CALCIUM SERPL-MCNC: 10 MG/DL
CHLORIDE SERPL-SCNC: 99 MMOL/L
CHOLEST SERPL-MCNC: 169 MG/DL
CO2 SERPL-SCNC: 26 MMOL/L
CREAT SERPL-MCNC: 0.7 MG/DL
EGFR: 91 ML/MIN/1.73M2
ESTIMATED AVERAGE GLUCOSE: 206 MG/DL
GLUCOSE SERPL-MCNC: 157 MG/DL
HBA1C MFR BLD HPLC: 8.8 %
HDLC SERPL-MCNC: 42 MG/DL
LDLC SERPL CALC-MCNC: 91 MG/DL
NONHDLC SERPL-MCNC: 127 MG/DL
POTASSIUM SERPL-SCNC: 4.6 MMOL/L
PROT SERPL-MCNC: 7.2 G/DL
SODIUM SERPL-SCNC: 137 MMOL/L
TRIGL SERPL-MCNC: 210 MG/DL

## 2024-06-05 RX ORDER — EMPAGLIFLOZIN 25 MG/1
25 TABLET, FILM COATED ORAL
Qty: 90 | Refills: 1 | Status: ACTIVE | COMMUNITY
Start: 2024-06-05 | End: 1900-01-01

## 2024-06-14 ENCOUNTER — APPOINTMENT (OUTPATIENT)
Dept: CARDIOLOGY | Facility: CLINIC | Age: 74
End: 2024-06-14

## 2024-08-21 ENCOUNTER — APPOINTMENT (OUTPATIENT)
Dept: CARDIOLOGY | Facility: CLINIC | Age: 74
End: 2024-08-21
Payer: MEDICARE

## 2024-08-21 ENCOUNTER — NON-APPOINTMENT (OUTPATIENT)
Age: 74
End: 2024-08-21

## 2024-08-21 VITALS
WEIGHT: 176 LBS | HEART RATE: 90 BPM | SYSTOLIC BLOOD PRESSURE: 130 MMHG | BODY MASS INDEX: 31.18 KG/M2 | DIASTOLIC BLOOD PRESSURE: 75 MMHG | OXYGEN SATURATION: 95 % | HEIGHT: 63 IN

## 2024-08-21 DIAGNOSIS — E78.5 HYPERLIPIDEMIA, UNSPECIFIED: ICD-10-CM

## 2024-08-21 DIAGNOSIS — I10 ESSENTIAL (PRIMARY) HYPERTENSION: ICD-10-CM

## 2024-08-21 PROCEDURE — 93000 ELECTROCARDIOGRAM COMPLETE: CPT

## 2024-08-21 PROCEDURE — G2211 COMPLEX E/M VISIT ADD ON: CPT

## 2024-08-21 PROCEDURE — 99214 OFFICE O/P EST MOD 30 MIN: CPT

## 2024-08-21 NOTE — DISCUSSION/SUMMARY
[EKG obtained to assist in diagnosis and management of assessed problem(s)] : EKG obtained to assist in diagnosis and management of assessed problem(s) [FreeTextEntry1] : 74 year old woman with history of DM (on Metformin), HLD (on atorvastatin) and anxiety (on depakote and seroquel) who is here for follow up.   # HTN - acceptable range  Continue Asa 81 mg QD  Continue Metoprolol ER 25 mg QD EKG without ischemia # HLD - Continue Atorvastatin 40 mg QD . # DM - Continue Metformin 500mg QD  #FU in 6 months

## 2024-08-21 NOTE — REASON FOR VISIT
[Arrhythmia/ECG Abnorrmalities] : arrhythmia/ECG abnormalities [Hypertension] : hypertension [Follow-Up - Clinic] : a clinic follow-up of

## 2024-08-21 NOTE — HISTORY OF PRESENT ILLNESS
[FreeTextEntry1] : 74 year old woman with history of DM (on Metformin), HLD (on atorvastatin) and anxiety (on depakote and seroquel) who is here for follow up. A Nuclear stress test performed by an outside cardiologist in 10/2020 showed normal LV function however small, mild inferior and inferoseptal ischemia.  TTE done as outpatient 10/3/2020: that showed kaycee LV function  EKG reviewed and normal BP controlled # HTN - acceptable range  Continue Asa 81 mg QD  Continue Metoprolol ER 25 mg QD EKG without ischemia # HLD - Continue Atorvastatin 40 mg QD . # DM - Continue Metformin 500mg QD .

## 2024-09-24 ENCOUNTER — EMERGENCY (EMERGENCY)
Facility: HOSPITAL | Age: 74
LOS: 1 days | Discharge: HOME | End: 2024-09-24
Attending: STUDENT IN AN ORGANIZED HEALTH CARE EDUCATION/TRAINING PROGRAM | Admitting: EMERGENCY MEDICINE
Payer: MEDICARE

## 2024-09-24 VITALS
HEART RATE: 83 BPM | TEMPERATURE: 98 F | WEIGHT: 160.06 LBS | SYSTOLIC BLOOD PRESSURE: 163 MMHG | DIASTOLIC BLOOD PRESSURE: 82 MMHG | RESPIRATION RATE: 18 BRPM | OXYGEN SATURATION: 97 %

## 2024-09-24 DIAGNOSIS — Z90.710 ACQUIRED ABSENCE OF BOTH CERVIX AND UTERUS: Chronic | ICD-10-CM

## 2024-09-24 DIAGNOSIS — Z98.890 OTHER SPECIFIED POSTPROCEDURAL STATES: Chronic | ICD-10-CM

## 2024-09-24 PROCEDURE — 99284 EMERGENCY DEPT VISIT MOD MDM: CPT

## 2024-09-24 RX ORDER — ACETAMINOPHEN 325 MG/1
650 TABLET ORAL ONCE
Refills: 0 | Status: COMPLETED | OUTPATIENT
Start: 2024-09-24 | End: 2024-09-24

## 2024-09-24 RX ADMIN — ACETAMINOPHEN 650 MILLIGRAM(S): 325 TABLET ORAL at 23:41

## 2024-09-24 NOTE — ED ADULT TRIAGE NOTE - CHIEF COMPLAINT QUOTE
Patient c/o headache and neck pain s/p trip and fall earlier today. Denies LOC, other trauma and blood thinner use. Hx. HTN and DM2.

## 2024-09-24 NOTE — ED ADULT NURSE NOTE - OBJECTIVE STATEMENT
Pt received in intake 10d. Pt alert and oriented x 4, ambulatory at baseline, Hx of HTN, DM2. Pt c/o headache and neck pain status post single mechanical trip and fall earlier today. Pt denies LOC, blood thinners, head trauma. Respirations even and unlabored, NAD. Pt denies chest pain, shortness of breath, N/V/D, constipation, dizziness, weakness, fever, chills,  symptoms. Pt medicated per MD orders.

## 2024-09-24 NOTE — ED ADULT NURSE NOTE - NSFALLRISKINTERV_ED_ALL_ED

## 2024-09-25 PROCEDURE — 72125 CT NECK SPINE W/O DYE: CPT | Mod: 26,MC

## 2024-09-25 PROCEDURE — 70450 CT HEAD/BRAIN W/O DYE: CPT | Mod: 26,MC

## 2024-09-25 NOTE — ED PROVIDER NOTE - NSFOLLOWUPINSTRUCTIONS_ED_ALL_ED_FT
Fall Prevention    WHAT YOU NEED TO KNOW:    Fall prevention includes ways to make your home and other areas safer. It also includes ways you can move more carefully to prevent a fall. Health conditions that cause changes in your blood pressure, vision, or muscle strength and coordination may increase your risk for falls. Medicines may also increase your risk for falls if they make you dizzy, weak, or sleepy.    DISCHARGE INSTRUCTIONS:    Call 911 or have someone else call if:  You have fallen and are unconscious.  You have fallen and cannot move part of your body.  Contact your healthcare provider if:  You have fallen and have pain or a headache.  You have questions or concerns about your condition or care.  Fall prevention tips:  Stand or sit up slowly. This may help you keep your balance and prevent falls.  Use assistive devices as directed. Your healthcare provider may suggest that you use a cane or walker to help you keep your balance. You may need to have grab bars put in your bathroom near the toilet or in the shower.  Wear shoes that fit well and have soles that . Wear shoes both inside and outside. Use slippers with good . Do not wear shoes with high heels.  Wear a personal alarm. This is a device that allows you to call 911 if you fall and need help. Ask your healthcare provider for more information.  Stay active. Exercise can help strengthen your muscles and improve your balance. Your healthcare provider may recommend water aerobics or walking. He or she may also recommend physical therapy to improve your coordination. Never start an exercise program without talking to your healthcare provider first.  Walking for Exercise  Manage your medical conditions. Keep all appointments with your healthcare providers. Visit your eye doctor as directed.  Home safety tips:  Fall Prevention for Adults  Add items to prevent falls in the bathroom. Put nonslip strips on your bath or shower floor to prevent you from slipping. Use a bath mat if you do not have carpet in the bathroom. This will prevent you from falling when you step out of the bath or shower. Use a shower seat so you do not need to stand while you shower. Sit on the toilet or a chair in your bathroom to dry yourself and put on clothing. This will prevent you from losing your balance from drying or dressing yourself while you are standing.  Keep paths clear. Remove books, shoes, and other objects from walkways and stairs. Place cords for telephones and lamps out of the way so that you do not need to walk over them. Tape them down if you cannot move them. Remove small rugs. If you cannot remove a rug, secure it with double-sided tape. This will prevent you from tripping.  Install bright lights in your home. Use night lights to help light paths to the bathroom or kitchen. Always turn on the light before you start walking.  Keep items you use often on shelves within reach. Do not use a step stool to help you reach an item.  Paint or place reflective tape on the edges of your stairs. This will help you see the stairs better.  Follow up with your healthcare provider as directed: Write down your questions so you remember to ask them during your visits.    Prevención de caídas    LO QUE NECESITA SABER:    La prevención de caídas incluye formas de hacer más seguro elam hogar y otras áreas. También incluye cómo moverse más cuidadosamente para evitar aixa caída. Las condiciones médicas que provocan cambios en la presión arterial, la visión o la fuerza muscular y la coordinación pueden llegar a aumentar elam riesgo de caídas. Los medicamentos también pueden aumentar elam riesgo de caídas si le provocan mareos, debilidad o somnolencia.    INSTRUCCIONES SOBRE EL BREE HOSPITALARIA:  Llame al 911 o pídale a alguien más que lo maggie si:  Se ha caído y está inconsciente.  Se ha caído y no puede  parte de elam cuerpo.  Comuníquese con elam médico si:  Se ha caído y tiene dolor en el cuerpo o dolor de angle.  Usted tiene preguntas o inquietudes acerca de elam condición o cuidado.  Recomendaciones para prevenir caídas:  Póngase de pie o siéntese despacio.[place] puede ayudarlo a mantener elam equilibrio y prevenir aixa caída.  Use dispositivos de apoyo liliana se le indique.Elam médico le puede recomendar que use un bastón o un caminador para que lo asista en elam equilibrio. Es posible que necesite que le instalen barandas en el baño cerca al inodoro o en la ducha.  Use calzado que le quede itz y tenga suelas antideslizantes.Use zapatos dentro y fuera de casa. Utilice pantunflas con aixa suela de buen agarre. No use zapatos con tacones altos.  Utilizar un dispositivo de alerta médica.Rabia es un dispositivo que puede llevar puesto y le permite llamar al 911 en domingo que se haya caído y necesite ayuda. Pídale a elam médico más información.  Manténgase activo.El ejercicio puede ayudar a fortalecer los músculos y mejorar elam equilibrio. Elam médico le puede recomendar hacer ejercicios aeróbicos acuáticos o caminar. También le puede recomendar la fisioterapia para mejorar elam coordinación. Nunca comience un programa de ejercicios sin consultarlo skyler con elam médico.  Caminar para ejercitarse  Controle brooke afecciones médicas.[place]umpla con todas las citas con brooke médicos. Visite al oculista liliana se le ha indicado.  Recomendaciones para la seguridad en el hogar:  Prevención de caídas para adultos  Agregue elementos para prevenir caídas en el baño.Coloque tiras antideslizantes en el piso de la ducha o de la bañera para evitar resbalones. Use aixa alfombra de baño si no tiene alfombra en el cuarto de baño. [place] evitará que se caiga cuando sale de la ducha o la bañera. Use un asiento de ducha de modo que no necesitará ponerse de pie mientras se ducha. Siéntese en el inodoro o en aixa silla en el cuarto de baño para secarse y vestirse. [place] impedirá que pierda el equilibrio mientras se seca o se viste estando de pie.  Mantener los pasillos despejados.Despeje las vías y las escaleras por donde camina retirando los libros, los zapatos u otros objetos. Coloque los cables del teléfono y las lámparas fuera de elam tyrel para que no tenga que caminar sobre ellos. Si no puede moverlos, sujételos con cinta adhesiva. Retire los tapetes pequeños. Si no puede quitar un tapete, sujételo con cinta de doble evelyne. Lo cual evitará que se tropiece con éstos.  Instale aixa buena iluminación en elam hogar.Use lamparillas de noche para ayudar a iluminar los pasillos al baño o a la cocina. Siempre encienda la william antes de empezar a caminar.  Mantenga los objetos que usa con frecuencia en estantes dentro de elam alcance.No use un banquito para intentar alcanzar un elemento.  Pinte o coloque cinta reflectiva en los bordes de las escaleras.Lo cual puede ayudarle a augustina mejor las escaleras.  Acuda a brooke consultas de control con elam médico según le indicaron.Anote brooke preguntas para que se acuerde de hacerlas chiara brooke visitas. You were seen in the emergency department tonight for a fall and hitting your head.      We obtained CT scans of your head and your neck and we did not find any new broken bones or bleeding in the brain.      Please take these results to your doctor to make sure you are following up and see if there is any additional things that they need to do such as testing or treatment.      In the meantime if you have pain can take Tylenol over-the-counter which she can get at any pharmacy for your pain.      If you start having any memory changes, nausea, vision or hearing changes you cannot eat or drink without throwing up or you have severe headache despite taking medications please come back to the emergency department for further evaluation at that time

## 2024-09-25 NOTE — ED PROVIDER NOTE - PATIENT PORTAL LINK FT
You can access the FollowMyHealth Patient Portal offered by [facility] by registering at the following website: [email or web address]. By joining [facility]’s FollowMyHealth portal, you will also be able to view your health information using other applications (apps) compatible with our system.

## 2024-09-25 NOTE — ED PROVIDER NOTE - PROGRESS NOTE DETAILS
MARY: Patient was signed out to me pending CT reads of head and neck for fall.  CTs were read as no acute findings.  Degenerative changes.  Patient and daughter were made aware.  Will discharge at this time to follow-up outpatient.  Return precautions explained and understood.

## 2024-09-25 NOTE — ED PROVIDER NOTE - CLINICAL SUMMARY MEDICAL DECISION MAKING FREE TEXT BOX
Evaluation for elderly female fall with head strike.  Pending CT head and neck.  If negative would discharge.  No signs or symptoms of concussion.  No other injuries on exam.

## 2024-09-25 NOTE — ED PROVIDER NOTE - OBJECTIVE STATEMENT
74-year-old female presents the ED with mechanical fall onto left side of her forehead.  She tripped on a hose.  No blood thinners.  No LOC.  Ambulatory after the fall.  Present to urgent care recommend she go to the ER for CAT scan.  No nausea or vomiting.  No pain with moving her eyes.  No pain behind the eye.  No loss of vision.  Denies chest pain shortness of breath abdominal pain or arm or leg pain.

## 2024-10-08 ENCOUNTER — APPOINTMENT (OUTPATIENT)
Dept: INTERNAL MEDICINE | Facility: CLINIC | Age: 74
End: 2024-10-08
Payer: MEDICARE

## 2024-10-08 VITALS
TEMPERATURE: 97 F | SYSTOLIC BLOOD PRESSURE: 131 MMHG | HEIGHT: 64 IN | BODY MASS INDEX: 30.73 KG/M2 | HEART RATE: 85 BPM | WEIGHT: 180 LBS | DIASTOLIC BLOOD PRESSURE: 77 MMHG

## 2024-10-08 DIAGNOSIS — E78.5 HYPERLIPIDEMIA, UNSPECIFIED: ICD-10-CM

## 2024-10-08 DIAGNOSIS — E11.9 TYPE 2 DIABETES MELLITUS W/OUT COMPLICATIONS: ICD-10-CM

## 2024-10-08 DIAGNOSIS — I10 ESSENTIAL (PRIMARY) HYPERTENSION: ICD-10-CM

## 2024-10-08 PROCEDURE — G0008: CPT

## 2024-10-08 PROCEDURE — 90662 IIV NO PRSV INCREASED AG IM: CPT

## 2024-10-08 PROCEDURE — 99214 OFFICE O/P EST MOD 30 MIN: CPT

## 2024-10-08 RX ORDER — LINAGLIPTIN 5 MG/1
5 TABLET, FILM COATED ORAL DAILY
Qty: 90 | Refills: 0 | Status: ACTIVE | COMMUNITY
Start: 2024-10-08 | End: 1900-01-01

## 2024-10-09 LAB
ESTIMATED AVERAGE GLUCOSE: 203 MG/DL
HBA1C MFR BLD HPLC: 8.7 %

## 2024-11-18 NOTE — H&P PST ADULT - BREASTS DETAILS
[FreeTextEntry1] : Constitutional:  Patient was well-developed, well-nourished and in no acute distress.   Head:  Normocephalic, atraumatic. Tympanic membranes were clear.   Neck:  Supple with full range of motion.   Cardiovascular:  Cardiac rhythm was regular without murmur. There were no carotid bruits. Peripheral pulses were full and symmetric.   Respiratory:  Lungs were clear.   Abdomen:  Soft and nontender.   Spine:  Nontender.   Skin:  There were no rashes.   NEUROLOGICAL EXAMINATION:  Mental Status: Patient was alert and oriented. Speech was fluent. There was no dysarthria.   Cranial Nerves:   II: She could finger count bilaterally. Pupils were equal and reactive. Visual fields were full. Funduscopic examination was normal.   III, IV, VI:  Eye movements were full without nystagmus.   V: Facial sensation was intact.   VII: Facial strength was normal.   VIII: Hearing was equal.   IX, X: Palatal movement was normal. Phonation was normal.   XI: Sternocleidomastoids and trapezii were normal.   XII: Tongue was midline and movements normal. There was no lingual atrophy or fasciculations.   Motor Examination: Muscle bulk, tone and strength were normal.  There were no Tinel signs at the wrists or elbows.  Sensory Examination: There was diminished pinprick in the right hand and foot.  Vibration and joint position sense were intact.  Reflexes: DTRs were 2 in the upper extremities and 1 at the left knee.  The right knee and both ankle jerks were absent.  Plantar Responses: Plantar responses were flexor.   Coordination/Cerebellar Function: There was no dysmetria on finger to nose or heel to shin testing.   Gait/Stance: Gait and tandem were normal. Romberg was negative.  nipples normal/no tenderness/no discharge or discoloration/mass R

## 2024-12-31 ENCOUNTER — TRANSCRIPTION ENCOUNTER (OUTPATIENT)
Age: 74
End: 2024-12-31

## 2025-01-09 ENCOUNTER — APPOINTMENT (OUTPATIENT)
Dept: INTERNAL MEDICINE | Facility: CLINIC | Age: 75
End: 2025-01-09
Payer: MEDICARE

## 2025-01-09 VITALS
SYSTOLIC BLOOD PRESSURE: 127 MMHG | HEIGHT: 64 IN | TEMPERATURE: 97.6 F | WEIGHT: 176 LBS | HEART RATE: 88 BPM | DIASTOLIC BLOOD PRESSURE: 74 MMHG | BODY MASS INDEX: 30.05 KG/M2 | OXYGEN SATURATION: 97 %

## 2025-01-09 DIAGNOSIS — E11.9 TYPE 2 DIABETES MELLITUS W/OUT COMPLICATIONS: ICD-10-CM

## 2025-01-09 DIAGNOSIS — E78.5 HYPERLIPIDEMIA, UNSPECIFIED: ICD-10-CM

## 2025-01-09 DIAGNOSIS — I10 ESSENTIAL (PRIMARY) HYPERTENSION: ICD-10-CM

## 2025-01-09 PROCEDURE — 99214 OFFICE O/P EST MOD 30 MIN: CPT

## 2025-01-09 PROCEDURE — 36415 COLL VENOUS BLD VENIPUNCTURE: CPT

## 2025-01-10 LAB
ANION GAP SERPL CALC-SCNC: 14 MMOL/L
BUN SERPL-MCNC: 13 MG/DL
CALCIUM SERPL-MCNC: 10.5 MG/DL
CHLORIDE SERPL-SCNC: 99 MMOL/L
CO2 SERPL-SCNC: 28 MMOL/L
CREAT SERPL-MCNC: 0.69 MG/DL
EGFR: 92 ML/MIN/1.73M2
ESTIMATED AVERAGE GLUCOSE: 177 MG/DL
GLUCOSE SERPL-MCNC: 113 MG/DL
HBA1C MFR BLD HPLC: 7.8 %
POTASSIUM SERPL-SCNC: 4.8 MMOL/L
SODIUM SERPL-SCNC: 141 MMOL/L

## 2025-01-10 RX ORDER — EMPAGLIFLOZIN 10 MG/1
10 TABLET, FILM COATED ORAL
Qty: 90 | Refills: 0 | Status: ACTIVE | COMMUNITY
Start: 2025-01-10 | End: 1900-01-01

## 2025-02-19 ENCOUNTER — NON-APPOINTMENT (OUTPATIENT)
Age: 75
End: 2025-02-19

## 2025-02-19 ENCOUNTER — APPOINTMENT (OUTPATIENT)
Dept: CARDIOLOGY | Facility: CLINIC | Age: 75
End: 2025-02-19
Payer: MEDICARE

## 2025-02-19 VITALS
WEIGHT: 170 LBS | OXYGEN SATURATION: 96 % | HEART RATE: 94 BPM | HEIGHT: 64 IN | DIASTOLIC BLOOD PRESSURE: 71 MMHG | BODY MASS INDEX: 29.02 KG/M2 | SYSTOLIC BLOOD PRESSURE: 115 MMHG

## 2025-02-19 DIAGNOSIS — I10 ESSENTIAL (PRIMARY) HYPERTENSION: ICD-10-CM

## 2025-02-19 DIAGNOSIS — E78.5 HYPERLIPIDEMIA, UNSPECIFIED: ICD-10-CM

## 2025-02-19 PROCEDURE — 99214 OFFICE O/P EST MOD 30 MIN: CPT

## 2025-02-19 PROCEDURE — G2211 COMPLEX E/M VISIT ADD ON: CPT

## 2025-02-19 PROCEDURE — 93000 ELECTROCARDIOGRAM COMPLETE: CPT

## 2025-03-04 ENCOUNTER — APPOINTMENT (OUTPATIENT)
Dept: INTERNAL MEDICINE | Facility: CLINIC | Age: 75
End: 2025-03-04
Payer: MEDICARE

## 2025-03-04 VITALS
OXYGEN SATURATION: 96 % | WEIGHT: 172 LBS | DIASTOLIC BLOOD PRESSURE: 75 MMHG | HEART RATE: 96 BPM | SYSTOLIC BLOOD PRESSURE: 124 MMHG | BODY MASS INDEX: 29.37 KG/M2 | TEMPERATURE: 97.4 F | HEIGHT: 64 IN

## 2025-03-04 DIAGNOSIS — I10 ESSENTIAL (PRIMARY) HYPERTENSION: ICD-10-CM

## 2025-03-04 DIAGNOSIS — Z00.00 ENCOUNTER FOR GENERAL ADULT MEDICAL EXAMINATION W/OUT ABNORMAL FINDINGS: ICD-10-CM

## 2025-03-04 DIAGNOSIS — E11.9 TYPE 2 DIABETES MELLITUS W/OUT COMPLICATIONS: ICD-10-CM

## 2025-03-04 PROCEDURE — 99213 OFFICE O/P EST LOW 20 MIN: CPT

## 2025-03-04 RX ORDER — SITAGLIPTIN 100 MG/1
100 TABLET, FILM COATED ORAL DAILY
Qty: 90 | Refills: 1 | Status: ACTIVE | COMMUNITY
Start: 2025-03-04 | End: 1900-01-01

## 2025-03-21 ENCOUNTER — TRANSCRIPTION ENCOUNTER (OUTPATIENT)
Age: 75
End: 2025-03-21

## 2025-03-21 DIAGNOSIS — E11.9 TYPE 2 DIABETES MELLITUS W/OUT COMPLICATIONS: ICD-10-CM

## 2025-03-21 RX ORDER — BLOOD SUGAR DIAGNOSTIC
STRIP MISCELLANEOUS
Qty: 100 | Refills: 0 | Status: ACTIVE | COMMUNITY
Start: 2025-03-21 | End: 1900-01-01

## 2025-03-21 RX ORDER — LANCETS 33 GAUGE
EACH MISCELLANEOUS
Qty: 1 | Refills: 0 | Status: ACTIVE | COMMUNITY
Start: 2025-03-21 | End: 1900-01-01

## 2025-04-22 ENCOUNTER — RX RENEWAL (OUTPATIENT)
Age: 75
End: 2025-04-22

## 2025-04-24 ENCOUNTER — RESULT REVIEW (OUTPATIENT)
Age: 75
End: 2025-04-24

## 2025-04-24 ENCOUNTER — OUTPATIENT (OUTPATIENT)
Dept: OUTPATIENT SERVICES | Facility: HOSPITAL | Age: 75
LOS: 1 days | End: 2025-04-24
Payer: COMMERCIAL

## 2025-04-24 ENCOUNTER — APPOINTMENT (OUTPATIENT)
Dept: MAMMOGRAPHY | Facility: IMAGING CENTER | Age: 75
End: 2025-04-24
Payer: MEDICARE

## 2025-04-24 ENCOUNTER — APPOINTMENT (OUTPATIENT)
Dept: ULTRASOUND IMAGING | Facility: IMAGING CENTER | Age: 75
End: 2025-04-24
Payer: MEDICARE

## 2025-04-24 DIAGNOSIS — Z00.8 ENCOUNTER FOR OTHER GENERAL EXAMINATION: ICD-10-CM

## 2025-04-24 DIAGNOSIS — Z98.890 OTHER SPECIFIED POSTPROCEDURAL STATES: Chronic | ICD-10-CM

## 2025-04-24 DIAGNOSIS — R92.1 MAMMOGRAPHIC CALCIFICATION FOUND ON DIAGNOSTIC IMAGING OF BREAST: ICD-10-CM

## 2025-04-24 DIAGNOSIS — Z90.710 ACQUIRED ABSENCE OF BOTH CERVIX AND UTERUS: Chronic | ICD-10-CM

## 2025-04-24 PROCEDURE — 77067 SCR MAMMO BI INCL CAD: CPT

## 2025-04-24 PROCEDURE — 76641 ULTRASOUND BREAST COMPLETE: CPT

## 2025-04-24 PROCEDURE — 76641 ULTRASOUND BREAST COMPLETE: CPT | Mod: 26,50

## 2025-04-24 PROCEDURE — 77063 BREAST TOMOSYNTHESIS BI: CPT | Mod: 26

## 2025-04-24 PROCEDURE — 77067 SCR MAMMO BI INCL CAD: CPT | Mod: 26

## 2025-04-24 PROCEDURE — 77063 BREAST TOMOSYNTHESIS BI: CPT

## 2025-05-28 ENCOUNTER — APPOINTMENT (OUTPATIENT)
Dept: SURGICAL ONCOLOGY | Facility: CLINIC | Age: 75
End: 2025-05-28
Payer: MEDICARE

## 2025-05-28 VITALS
DIASTOLIC BLOOD PRESSURE: 78 MMHG | HEART RATE: 86 BPM | SYSTOLIC BLOOD PRESSURE: 120 MMHG | BODY MASS INDEX: 30.73 KG/M2 | WEIGHT: 180 LBS | OXYGEN SATURATION: 96 % | HEIGHT: 64 IN

## 2025-05-28 DIAGNOSIS — D36.9 BENIGN NEOPLASM, UNSPECIFIED SITE: ICD-10-CM

## 2025-05-28 PROCEDURE — 99214 OFFICE O/P EST MOD 30 MIN: CPT

## 2025-06-05 ENCOUNTER — APPOINTMENT (OUTPATIENT)
Dept: INTERNAL MEDICINE | Facility: CLINIC | Age: 75
End: 2025-06-05
Payer: MEDICARE

## 2025-06-05 VITALS
HEART RATE: 80 BPM | WEIGHT: 175 LBS | BODY MASS INDEX: 29.88 KG/M2 | TEMPERATURE: 97.6 F | OXYGEN SATURATION: 91 % | HEIGHT: 64 IN | DIASTOLIC BLOOD PRESSURE: 78 MMHG | SYSTOLIC BLOOD PRESSURE: 127 MMHG

## 2025-06-05 DIAGNOSIS — E78.5 HYPERLIPIDEMIA, UNSPECIFIED: ICD-10-CM

## 2025-06-05 DIAGNOSIS — E11.9 TYPE 2 DIABETES MELLITUS W/OUT COMPLICATIONS: ICD-10-CM

## 2025-06-05 DIAGNOSIS — I10 ESSENTIAL (PRIMARY) HYPERTENSION: ICD-10-CM

## 2025-06-05 PROCEDURE — 99214 OFFICE O/P EST MOD 30 MIN: CPT

## 2025-06-05 PROCEDURE — 36415 COLL VENOUS BLD VENIPUNCTURE: CPT

## 2025-06-06 LAB
ANION GAP SERPL CALC-SCNC: 14 MMOL/L
BUN SERPL-MCNC: 15 MG/DL
CALCIUM SERPL-MCNC: 10.2 MG/DL
CHLORIDE SERPL-SCNC: 100 MMOL/L
CHOLEST SERPL-MCNC: 187 MG/DL
CO2 SERPL-SCNC: 27 MMOL/L
CREAT SERPL-MCNC: 0.81 MG/DL
EGFRCR SERPLBLD CKD-EPI 2021: 76 ML/MIN/1.73M2
GLUCOSE SERPL-MCNC: 97 MG/DL
HDLC SERPL-MCNC: 49 MG/DL
LDLC SERPL-MCNC: 101 MG/DL
NONHDLC SERPL-MCNC: 138 MG/DL
POTASSIUM SERPL-SCNC: 5.1 MMOL/L
SODIUM SERPL-SCNC: 141 MMOL/L
TRIGL SERPL-MCNC: 218 MG/DL

## 2025-06-10 LAB
ESTIMATED AVERAGE GLUCOSE: 171 MG/DL
HBA1C MFR BLD HPLC: 7.6 %

## 2025-06-18 ENCOUNTER — RX RENEWAL (OUTPATIENT)
Age: 75
End: 2025-06-18

## 2025-06-18 RX ORDER — LANCETS
EACH MISCELLANEOUS
Qty: 100 | Refills: 0 | Status: ACTIVE | COMMUNITY
Start: 2025-06-18 | End: 1900-01-01

## 2025-07-02 ENCOUNTER — RX RENEWAL (OUTPATIENT)
Age: 75
End: 2025-07-02

## 2025-07-08 ENCOUNTER — RX RENEWAL (OUTPATIENT)
Age: 75
End: 2025-07-08

## 2025-08-21 ENCOUNTER — APPOINTMENT (OUTPATIENT)
Dept: CARDIOLOGY | Facility: CLINIC | Age: 75
End: 2025-08-21
Payer: MEDICARE

## 2025-08-21 VITALS
HEIGHT: 64 IN | RESPIRATION RATE: 16 BRPM | SYSTOLIC BLOOD PRESSURE: 123 MMHG | HEART RATE: 84 BPM | TEMPERATURE: 97.5 F | OXYGEN SATURATION: 97 % | BODY MASS INDEX: 30.48 KG/M2 | DIASTOLIC BLOOD PRESSURE: 71 MMHG | WEIGHT: 178.56 LBS

## 2025-08-21 DIAGNOSIS — R00.2 PALPITATIONS: ICD-10-CM

## 2025-08-21 DIAGNOSIS — I10 ESSENTIAL (PRIMARY) HYPERTENSION: ICD-10-CM

## 2025-08-21 PROCEDURE — G2211 COMPLEX E/M VISIT ADD ON: CPT

## 2025-08-21 PROCEDURE — 93000 ELECTROCARDIOGRAM COMPLETE: CPT

## 2025-08-21 PROCEDURE — 99214 OFFICE O/P EST MOD 30 MIN: CPT

## 2025-09-02 ENCOUNTER — TRANSCRIPTION ENCOUNTER (OUTPATIENT)
Age: 75
End: 2025-09-02